# Patient Record
Sex: MALE | Race: BLACK OR AFRICAN AMERICAN | NOT HISPANIC OR LATINO | Employment: FULL TIME | URBAN - METROPOLITAN AREA
[De-identification: names, ages, dates, MRNs, and addresses within clinical notes are randomized per-mention and may not be internally consistent; named-entity substitution may affect disease eponyms.]

---

## 2017-04-26 ENCOUNTER — TRANSFERRED RECORDS (OUTPATIENT)
Dept: HEALTH INFORMATION MANAGEMENT | Facility: CLINIC | Age: 22
End: 2017-04-26

## 2017-05-05 ENCOUNTER — OFFICE VISIT (OUTPATIENT)
Dept: FAMILY MEDICINE | Facility: CLINIC | Age: 22
End: 2017-05-05
Payer: COMMERCIAL

## 2017-05-05 VITALS
DIASTOLIC BLOOD PRESSURE: 88 MMHG | OXYGEN SATURATION: 99 % | HEART RATE: 67 BPM | TEMPERATURE: 98 F | BODY MASS INDEX: 27.21 KG/M2 | WEIGHT: 223.5 LBS | SYSTOLIC BLOOD PRESSURE: 130 MMHG

## 2017-05-05 DIAGNOSIS — R51.9 HEADACHE DISORDER: ICD-10-CM

## 2017-05-05 DIAGNOSIS — M25.519 SHOULDER PAIN, UNSPECIFIED CHRONICITY, UNSPECIFIED LATERALITY: ICD-10-CM

## 2017-05-05 DIAGNOSIS — M54.6 ACUTE RIGHT-SIDED THORACIC BACK PAIN: Primary | ICD-10-CM

## 2017-05-05 PROCEDURE — 99213 OFFICE O/P EST LOW 20 MIN: CPT | Performed by: FAMILY MEDICINE

## 2017-05-05 RX ORDER — CYCLOBENZAPRINE HCL 10 MG
10 TABLET ORAL 3 TIMES DAILY PRN
Qty: 45 TABLET | Refills: 0 | Status: SHIPPED | OUTPATIENT
Start: 2017-05-05

## 2017-05-05 RX ORDER — DICLOFENAC SODIUM 75 MG/1
75 TABLET, DELAYED RELEASE ORAL 2 TIMES DAILY PRN
Qty: 60 TABLET | Refills: 1 | Status: SHIPPED | OUTPATIENT
Start: 2017-05-05

## 2017-05-05 ASSESSMENT — PAIN SCALES - GENERAL: PAINLEVEL: SEVERE PAIN (7)

## 2017-05-05 NOTE — NURSING NOTE
"Chief Complaint   Patient presents with     ER F/U       Initial /88  Pulse 67  Temp 98  F (36.7  C) (Oral)  Wt 223 lb 8 oz (101.4 kg)  SpO2 99%  BMI 27.21 kg/m2 Estimated body mass index is 27.21 kg/(m^2) as calculated from the following:    Height as of 5/12/16: 6' 4\" (1.93 m).    Weight as of this encounter: 223 lb 8 oz (101.4 kg).  Medication Reconciliation: complete    "
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
## 2017-05-05 NOTE — PATIENT INSTRUCTIONS
[facility]    If you have any questions regarding to your visit please contact your care team:       Team Purple:   Clinic Hours Telephone Number   [address]ATH[address]., Dr.   7am-7pm  Monday - Thursday   7am-5pm  Fridays  [phone]  (Appointment scheduling available 24/7)    Questions about your Visit?   Team Line:  [phone]   Urgent Care - [place] and [facility] - 11am-9pm Monday-Friday Saturday-Sunday- 9am-5pm   [place] - 5pm-9pm Monday-Friday Saturday-Sunday- 9am-5pm  [phone] - [facility]  [phone] - [place]       What options do I have for visits at the clinic other than the traditional office visit?  To expand how we care for you, many of our providers are utilizing electronic visits (e-visits) and telephone visits, when medically appropriate, for interactions with their patients rather than a visit in the clinic.   We also offer nurse visits for many medical concerns. Just like any other service, we will bill your insurance company for this type of visit based on time spent on the phone with your provider. Not all insurance companies cover these visits. Please check with your medical insurance if this type of visit is covered. You will be responsible for any charges that are not paid by your insurance.      E-visits via [employer]:  generally incur a $35.00 fee.  Telephone visits:  Time spent on the phone: *charged based on time that is spent on the phone in increments of 10 minutes. Estimated cost:   5-10 mins $30.00   11-20 mins. $59.00   21-30 mins. $85.00     Use [employer] (secure email communication and access to your chart) to send your primary care provider a message or make an appointment. Ask someone on your Team how to sign up for [employer].  For a Price Quote for your services, please call our Consumer Price Line at [phone].  As always, Thank you for trusting us with your health care  needs!    Discharged by JOB Smith

## 2017-05-05 NOTE — MR AVS SNAPSHOT
After Visit Summary   5/5/2017    Rossy Dodson    MRN: [id number]           Patient Information     Date Of Birth          1995        Visit Information        Provider Department      5/5/2017 11:40 AM Stone Sanderson MD [facility]        Today's Diagnoses     Acute right-sided thoracic back pain    -  1    Headache disorder        Shoulder pain, unspecified chronicity, unspecified laterality: Rt > Lt          Care Instructions    [facility]    If you have any questions regarding to your visit please contact your care team:       Team Purple:   Clinic Hours Telephone Number   KATHARINA Smith Dr., Dr.   7am-7pm  Monday - Thursday   7am-5pm  Fridays  [phone]  (Appointment scheduling available 24/7)    Questions about your Visit?   Team Line:  [phone]   [facility] [place] [facility] - 11am-9pm Monday-Friday Saturday-Sunday- 9am-5pm   [place] - 5pm-9pm Monday-Friday Saturday-Sunday- 9am-5pm  [phone] - [place]   [phone] - [place]       What options do I have for visits at the clinic other than the traditional office visit?  To expand how we care for you, many of our providers are utilizing electronic visits (e-visits) and telephone visits, when medically appropriate, for interactions with their patients rather than a visit in the clinic.   We also offer nurse visits for many medical concerns. Just like any other service, we will bill your insurance company for this type of visit based on time spent on the phone with your provider. Not all insurance companies cover these visits. Please check with your medical insurance if this type of visit is covered. You will be responsible for any charges that are not paid by your insurance.      E-visits via [employer]:  generally incur a $35.00 fee.  Telephone visits:  Time spent on the phone: *charged based on time that is spent on  "the phone in increments of 10 minutes. Estimated cost:   5-10 mins $30.00   11-20 mins. $59.00   21-30 mins. $85.00     Use CarePoint [employer]hart (secure email communication and access to your chart) to send your primary care provider a message or make an appointment. Ask someone on your Team how to sign up for [employer].  For a Price Quote for your services, please call our H-care Line at [phone].  As always, Thank you for trusting us with your health care needs!    Discharged by JOB Smith          Follow-ups after your visit        Follow-up notes from your care team     Return in about 2 weeks (around 5/19/2017).      Who to contact     If you have questions or need follow up information about today's clinic visit or your schedule please contact [facility] directly at [phone].  Normal or non-critical lab and imaging results will be communicated to you by MyChart, letter or phone within 4 business days after the clinic has received the results. If you do not hear from us within 7 days, please contact the clinic through [employer] or phone. If you have a critical or abnormal lab result, we will notify you by phone as soon as possible.  Submit refill requests through LeadCloud or call your pharmacy and they will forward the refill request to us. Please allow 3 business days for your refill to be completed.          Additional Information About Your Visit        [employer] Information     Volext lets you send messages to your doctor, view your test results, renew your prescriptions, schedule appointments and more. To sign up, go to [email or web address] . Click on \"Log in\" on the left side of the screen, which will take you to the Welcome page. Then click on \"Sign up Now\" on the right side of the page.     You will be asked to enter the access code listed below, as well as some personal information. Please follow the directions to create your username and password.     Your access code is: " [email or web address]  Expires: 2017  7:03 PM     Your access code will  in 90 days. If you need help or a new code, please call your [place] clinic or [phone].        Care EveryWhere ID     This is your Care EveryWhere ID. This could be used by other organizations to access your [place] medical records  [id number]        Your Vitals Were     Pulse Temperature Pulse Oximetry BMI (Body Mass Index)          67 98  F (36.7  C) (Oral) 99% 27.21 kg/m2         Blood Pressure from Last 3 Encounters:   17 130/88   16 149/70   16 (!) 138/114    Weight from Last 3 Encounters:   17 223 lb 8 oz (101.4 kg)   16 178 lb (80.7 kg)   08/18/15 187 lb 8 oz (85 kg)              Today, you had the following     No orders found for display         Today's Medication Changes          These changes are accurate as of: 17 12:16 PM.  If you have any questions, ask your nurse or doctor.               Start taking these medicines.        Dose/Directions    cyclobenzaprine 10 MG tablet   Commonly known as:  FLEXERIL   Used for:  Acute right-sided thoracic back pain, Headache disorder   Started by:  Stone Sanderson MD        Dose:  10 mg   Take 1 tablet (10 mg) by mouth 3 times daily as needed for muscle spasms   Quantity:  45 tablet   Refills:  0       diclofenac 75 MG EC tablet   Commonly known as:  VOLTAREN   Used for:  Acute right-sided thoracic back pain, Headache disorder, Shoulder pain, unspecified chronicity, unspecified laterality   Started by:  Stone Sanderson MD        Dose:  75 mg   Take 1 tablet (75 mg) by mouth 2 times daily as needed for moderate pain   Quantity:  60 tablet   Refills:  1            Where to get your medicines      These medications [facility] to [facility] [address]  [address] AT [facility] & [place]  [address] [place] [address]     Phone:  [phone]     cyclobenzaprine 10 MG tablet    diclofenac  75 MG EC tablet                Primary Care Provider Office Phone # Fax #    Stone Sanderson -[phone]       [address] [address]        Thank you!     Thank you for choosing [facility]  for your care. Our goal is always to provide you with excellent care. Hearing back from our patients is one way we can continue to improve our services. Please take a few minutes to complete the written survey that you may receive in the mail after your visit with us. Thank you!             Your Updated Medication List - Protect others around you: Learn how to safely use, store and throw away your medicines at www.disposemymeds.org.          This list is accurate as of: 5/5/17 12:16 PM.  Always use your most recent med list.                   Brand Name Dispense Instructions for use    cyclobenzaprine 10 MG tablet    FLEXERIL    45 tablet    Take 1 tablet (10 mg) by mouth 3 times daily as needed for muscle spasms       diclofenac 75 MG EC tablet    VOLTAREN    60 tablet    Take 1 tablet (75 mg) by mouth 2 times daily as needed for moderate pain       famotidine 40 MG tablet    PEPCID    30 tablet    Take 1 tablet (40 mg) by mouth daily

## 2017-11-21 ENCOUNTER — TRANSFERRED RECORDS (OUTPATIENT)
Dept: HEALTH INFORMATION MANAGEMENT | Facility: CLINIC | Age: 22
End: 2017-11-21

## 2017-11-24 ENCOUNTER — TRANSFERRED RECORDS (OUTPATIENT)
Dept: HEALTH INFORMATION MANAGEMENT | Facility: CLINIC | Age: 22
End: 2017-11-24

## 2017-12-10 ENCOUNTER — TRANSFERRED RECORDS (OUTPATIENT)
Dept: HEALTH INFORMATION MANAGEMENT | Facility: CLINIC | Age: 22
End: 2017-12-10

## 2018-01-24 ENCOUNTER — TELEPHONE (OUTPATIENT)
Dept: FAMILY MEDICINE | Facility: CLINIC | Age: 23
End: 2018-01-24

## 2018-01-24 NOTE — TELEPHONE ENCOUNTER
"Reason for Call:  Other call back    Detailed comments:  Patient calling. He has a fever, chest pain, for 2 days.     Phone Number Patient can be reached at: Other phone number:   [phone]    Best Time:      Can we leave a detailed message on this number? { :[phone]::\"YES\"}    Call taken on 1/24/2018 at 8:35 AM by Izzy Felipe    "

## 2018-02-21 ENCOUNTER — OFFICE VISIT (OUTPATIENT)
Dept: FAMILY MEDICINE | Facility: CLINIC | Age: 23
End: 2018-02-21
Payer: COMMERCIAL

## 2018-02-21 VITALS
OXYGEN SATURATION: 98 % | WEIGHT: 224.6 LBS | HEART RATE: 72 BPM | DIASTOLIC BLOOD PRESSURE: 78 MMHG | TEMPERATURE: 96.9 F | SYSTOLIC BLOOD PRESSURE: 118 MMHG | BODY MASS INDEX: 27.34 KG/M2

## 2018-02-21 DIAGNOSIS — N50.82 SCROTAL PAIN: ICD-10-CM

## 2018-02-21 DIAGNOSIS — R10.30 ABDOMINAL PAIN, LOWER: Primary | ICD-10-CM

## 2018-02-21 PROCEDURE — 87591 N.GONORRHOEAE DNA AMP PROB: CPT | Performed by: FAMILY MEDICINE

## 2018-02-21 PROCEDURE — 99213 OFFICE O/P EST LOW 20 MIN: CPT | Performed by: FAMILY MEDICINE

## 2018-02-21 PROCEDURE — 87491 CHLMYD TRACH DNA AMP PROBE: CPT | Performed by: FAMILY MEDICINE

## 2018-02-21 NOTE — PROGRESS NOTES
"  SUBJECTIVE:   Rossy Dodson is a 22 year old male who presents to clinic today for the following health issues:    ABDOMINAL PAIN     Onset: 2 weeks    Description:   Character: Dull ache  Location: pelvic region  Radiation: None    Intensity: currently 4/10 at its worst 8/10    Progression of Symptoms:  worsening and intermittent    Accompanying Signs & Symptoms:  Fever/Chills?: no   Gas/Bloating: no   Nausea: no   Vomitting: no   Diarrhea?: YES  Constipation:YES  Dysuria or Hematuria: no    History:   Trauma: no   Previous similar pain: YES   Previous tests done: none    Precipitating factors:   Does the pain change with:     Food: no      BM: no     Urination: no     Alleviating factors:      Therapies Tried and outcome: Drinking milk ; Helped    LMP:  not applicable     Pain is in the lower abdomen, radiates to the right testicular area.  No dysuria or discharge. He says partner was diagnosed with \"chlamydia pneumonia\" and he was also given treatment  Bowel movement normal  No swelling in the upper abdomen.    Problem list and histories reviewed & adjusted, as indicated.  Additional history: as documented    Patient Active Problem List   Diagnosis     Learning difficulty, other     Hyperlipidemia LDL goal <160     No past surgical history on file.    Social History   Substance Use Topics     Smoking status: Never Smoker     Smokeless tobacco: Never Used     Alcohol use No     No family history on file.      Reviewed and updated as needed this visit by clinical staff    ROS:  Constitutional, HEENT, cardiovascular, pulmonary, gi and gu systems are negative, except as otherwise noted.    OBJECTIVE:     /78  Pulse 72  Temp 96.9  F (36.1  C) (Oral)  Wt 224 lb 9.6 oz (101.9 kg)  SpO2 98%  BMI 27.34 kg/m2  Body mass index is 27.34 kg/(m^2).  GENERAL: healthy, alert and no distress  NECK: no adenopathy and thyroid normal to palpation  RESP: lungs clear to auscultation - no rales, rhonchi or wheezes  CV: " regular rate and rhythm, normal S1 S2, no S3 or S4, no murmur, click or rub, no peripheral edema.  ABDOMEN: soft, nontender, no masses and bowel sounds normal   (male): normal male genitalia without lesions or urethral discharge, no hernia, no scrotal swelling slight tenderness with prominence in the left epididymis  MS: no gross musculoskeletal defects noted, no edema    Diagnostic Test Results:  none     ASSESSMENT/PLAN:     (R10.30) Abdominal pain, lower  (primary encounter diagnosis)  Comment: Etiology not clear but cold be radiation from scrotum  Plan: Chlamydia check    (N50.82) Scrotal pain  Comment: Differentials: Epididymitis, varicocele, hydrocele. Discussed evaluation with ultrasound and check for chlamydia.  Plan: NEISSERIA GONORRHOEA PCR, CHLAMYDIA TRACHOMATIS        PCR, US Testicular and Scrotum      Stone Sanderson MD  [facility]

## 2018-02-21 NOTE — MR AVS SNAPSHOT
After Visit Summary   2/21/2018    Rossy Dodson    MRN: [id number]           Patient Information     Date Of Birth          1995        Visit Information        Provider Department      2/21/2018 12:40 PM Stone Sanderson MD [facility]        Today's Diagnoses     Abdominal pain, lower    -  1    Scrotal pain          Care Instructions    [facility]    If you have any questions regarding to your visit please contact your care team:       Team Purple:   Clinic Hours Telephone Number   Dr. Elsie Lipscomb   7am-7pm  Monday - Thursday   7am-5pm  Fridays  [phone]  (Appointment scheduling available 24/7)    Questions about your Visit?   Team Line:  [phone]   Urgent Care - [place] and [place] - 11am-9pm Monday-Friday Saturday-Sunday- 9am-5pm   [place] - 5pm-9pm Monday-Friday Saturday-Sunday- 9am-5pm  [phone] - [facility]  [phone] - [place]       What options do I have for visits at the clinic other than the traditional office visit?  To expand how we care for you, many of our providers are utilizing electronic visits (e-visits) and telephone visits, when medically appropriate, for interactions with their patients rather than a visit in the clinic.   We also offer nurse visits for many medical concerns. Just like any other service, we will bill your insurance company for this type of visit based on time spent on the phone with your provider. Not all insurance companies cover these visits. Please check with your medical insurance if this type of visit is covered. You will be responsible for any charges that are not paid by your insurance.      E-visits via [employer]:  generally incur a $35.00 fee.  Telephone visits:  Time spent on the phone: *charged based on time that is spent on the phone in increments of 10 minutes. Estimated cost:   5-10 mins $30.00   11-20 mins.  "$59.00   21-30 mins. $85.00     Use [employer]UBI[employer]art (secure email communication and access to your chart) to send your primary care provider a message or make an appointment. Ask someone on your Team how to sign up for [employer].  For a Price Quote for your services, please call our Consumer Price Line at [phone].  As always, Thank you for trusting us with your health care needs!    Discharged By: An            Follow-ups after your visit        Future tests that were ordered for you today     Open Future Orders        Priority Expected Expires Ordered    US Testicular and Scrotum Routine  2/21/2019 2/21/2018            Who to contact     If you have questions or need follow up information about today's clinic visit or your schedule please contact [facility] directly at [phone].  Normal or non-critical lab and imaging results will be communicated to you by THUBIThart, letter or phone within 4 business days after the clinic has received the results. If you do not hear from us within 7 days, please contact the clinic through THUBI[employer]art or phone. If you have a critical or abnormal lab result, we will notify you by phone as soon as possible.  Submit refill requests through [employer] or call your pharmacy and they will forward the refill request to us. Please allow 3 business days for your refill to be completed.          Additional Information About Your Visit        [employer]art Information     [employer] lets you send messages to your doctor, view your test results, renew your prescriptions, schedule appointments and more. To sign up, go to [email or web address] . Click on \"Log in\" on the left side of the screen, which will take you to the Welcome page. Then click on \"Sign up Now\" on the right side of the page.     You will be asked to enter the access code listed below, as well as some personal information. Please follow the directions to create your username and password.     Your access code is: " [email or web address]  Expires: 2018  9:44 AM     Your access code will  in 90 days. If you need help or a new code, please call your [place] clinic or [phone].        Care EveryWhere ID     This is your Care EveryWhere ID. This could be used by other organizations to access your [place] medical records  [id number]        Your Vitals Were     Pulse Temperature Pulse Oximetry BMI (Body Mass Index)          72 96.9  F (36.1  C) (Oral) 98% 27.34 kg/m2         Blood Pressure from Last 3 Encounters:   18 118/78   17 130/88   16 149/70    Weight from Last 3 Encounters:   18 224 lb 9.6 oz (101.9 kg)   17 223 lb 8 oz (101.4 kg)   16 178 lb (80.7 kg)              We Performed the Following     CHLAMYDIA TRACHOMATIS PCR     NEISSERIA GONORRHOEA PCR        Primary Care Provider Office Phone # Fax #    Stone Sanderson -[phone]       [address] [facility] [address]        Equal Access to Services     [facility] AH: Hadii aad ku hadasho Soomaali, waaxda luqadaha, qaybta kaalmada adeegyada, mari downs . So [facility] [phone].    ATENCIÓN: Si habla español, tiene a saba disposición servicios gratuitos de asistencia lingüística. Llame al [phone].    We comply with applicable federal civil rights laws and [place] laws. We do not discriminate on the basis of race, color, national origin, age, disability, sex, sexual orientation, or gender identity.            Thank you!     Thank you for choosing [facility]  for your care. Our goal is always to provide you with excellent care. Hearing back from our patients is one way we can continue to improve our services. Please take a few minutes to complete the written survey that you may receive in the mail after your visit with us. Thank you!             Your Updated Medication List - Protect others around you: Learn how to safely use, store and throw away your  medicines at www.disposemymeds.org.          This list is accurate as of 2/21/18  1:20 PM.  Always use your most recent med list.                   Brand Name Dispense Instructions for use Diagnosis    cyclobenzaprine 10 MG tablet    FLEXERIL    45 tablet    Take 1 tablet (10 mg) by mouth 3 times daily as needed for muscle spasms    Acute right-sided thoracic back pain, Headache disorder       diclofenac 75 MG EC tablet    VOLTAREN    60 tablet    Take 1 tablet (75 mg) by mouth 2 times daily as needed for moderate pain    Acute right-sided thoracic back pain, Headache disorder, Shoulder pain, unspecified chronicity, unspecified laterality       famotidine 40 MG tablet    PEPCID    30 tablet    Take 1 tablet (40 mg) by mouth daily

## 2018-02-21 NOTE — PATIENT INSTRUCTIONS
[facility]    If you have any questions regarding to your visit please contact your care team:       Team Purple:   Clinic Hours Telephone Number   Dr. Elsie Lipscomb   7am-7pm  Monday - Thursday   7am-5pm  Fridays  [phone]  (Appointment scheduling available 24/7)    Questions about your Visit?   Team Line:  [phone]   Urgent Care - [place] and [facility] - 11am-9pm Monday-Friday Saturday-Sunday- 9am-5pm   [place] - 5pm-9pm Monday-Friday Saturday-Sunday- 9am-5pm  [phone] - [facility]  [phone] - [place]       What options do I have for visits at the clinic other than the traditional office visit?  To expand how we care for you, many of our providers are utilizing electronic visits (e-visits) and telephone visits, when medically appropriate, for interactions with their patients rather than a visit in the clinic.   We also offer nurse visits for many medical concerns. Just like any other service, we will bill your insurance company for this type of visit based on time spent on the phone with your provider. Not all insurance companies cover these visits. Please check with your medical insurance if this type of visit is covered. You will be responsible for any charges that are not paid by your insurance.      E-visits via [employer]in:  generally incur a $35.00 fee.  Telephone visits:  Time spent on the phone: *charged based on time that is spent on the phone in increments of 10 minutes. Estimated cost:   5-10 mins $30.00   11-20 mins. $59.00   21-30 mins. $85.00     Use [employer] (secure email communication and access to your chart) to send your primary care provider a message or make an appointment. Ask someone on your Team how to sign up for [employer].  For a Price Quote for your services, please call our Consumer Price Line at [phone].  As always, Thank you for trusting us with your health care  needs!    Discharged By: An

## 2018-02-22 LAB
C TRACH DNA SPEC QL NAA+PROBE: NEGATIVE
N GONORRHOEA DNA SPEC QL NAA+PROBE: NEGATIVE
SPECIMEN SOURCE: NORMAL
SPECIMEN SOURCE: NORMAL

## 2018-05-24 ENCOUNTER — TRANSFERRED RECORDS (OUTPATIENT)
Dept: HEALTH INFORMATION MANAGEMENT | Facility: CLINIC | Age: 23
End: 2018-05-24

## 2021-11-16 ENCOUNTER — OFFICE VISIT (OUTPATIENT)
Dept: FAMILY MEDICINE | Facility: CLINIC | Age: 26
End: 2021-11-16
Payer: COMMERCIAL

## 2021-11-16 VITALS
WEIGHT: 207 LBS | SYSTOLIC BLOOD PRESSURE: 103 MMHG | HEIGHT: 70 IN | OXYGEN SATURATION: 99 % | HEART RATE: 74 BPM | BODY MASS INDEX: 29.63 KG/M2 | TEMPERATURE: 98 F | DIASTOLIC BLOOD PRESSURE: 67 MMHG

## 2021-11-16 DIAGNOSIS — R19.7 DIARRHEA, UNSPECIFIED TYPE: Primary | ICD-10-CM

## 2021-11-16 PROCEDURE — 99203 OFFICE O/P NEW LOW 30 MIN: CPT | Performed by: FAMILY MEDICINE

## 2021-11-16 ASSESSMENT — MIFFLIN-ST. JEOR: SCORE: 1919.58

## 2021-11-16 NOTE — PROGRESS NOTES
"  Assessment & Plan       ICD-10-CM    1. Diarrhea, unspecified type  R19.7 Adult Gastro Ref - Consult Only     Enteric Bacteria and Virus Panel by LD Stool     Fecal Lactoferrin     Occult blood fecal HGB immuno     Cryptosporidium Stain     Clostridium difficile toxin B PCR     Clostridium difficile toxin B PCR     Cryptosporidium Stain     Fecal Lactoferrin     Enteric Bacteria and Virus Panel by LD Stool     Occult blood fecal HGB immuno         Review of external notes as documented elsewhere in note           Return in about 2 weeks (around 11/30/2021) for With Specialist.    Burt Lipscomb MD  [facility] SAPNA Blair is a 26 year old who presents for the following health issues     HPI     Diarrhea  Onset/Duration: 1 week  Description:       Consistency of stool: watery, yellow and green       Blood in stool: no       Number of loose stools past 24 hours: 15-20  Progression of Symptoms: worsening  Accompanying signs and symptoms:       Fever: no       Nausea/Vomiting: no       Abdominal pain: no       Weight loss: no       Episodes of constipation: no  History   Ill contacts: no  Recent use of antibiotics: no  Recent travels: no  Recent medication-new or changes(Rx or OTC): no  Precipitating or alleviating factors: None  Therapies tried and outcome: PeptoBismol      Abdominal pain from 2018 has resolved  Saw [place] gastro  Wanted to do upper endoscopy however had to leave the country  Bloody stool has resolved    Currently has diarrhea  Every 10 mins  watery  X 3-4 days  pepto helped, less frequent stool          Review of Systems   Constitutional, HEENT, cardiovascular, pulmonary, gi and gu systems are negative, except as otherwise noted.      Objective    /67   Pulse 74   Temp 98  F (36.7  C) (Oral)   Ht 1.777 m (5' 9.96\")   Wt 93.9 kg (207 lb)   SpO2 99%   BMI 29.73 kg/m    Body mass index is 29.73 kg/m .  Physical Exam   GENERAL: healthy, alert and no " distress  EYES: Eyes grossly normal to inspection, PERRL and conjunctivae and sclerae normal  NECK: no adenopathy, no asymmetry, masses, or scars and thyroid normal to palpation  ABDOMEN: soft, nontender, no hepatosplenomegaly, no masses and bowel sounds normal  SKIN: no suspicious lesions or rashes  PSYCH: mentation appears normal, affect normal/bright

## 2023-06-20 ENCOUNTER — HOSPITAL ENCOUNTER (EMERGENCY)
Facility: CLINIC | Age: 28
Discharge: HOME | End: 2023-06-20
Attending: EMERGENCY MEDICINE | Admitting: EMERGENCY MEDICINE
Payer: COMMERCIAL

## 2023-06-20 ENCOUNTER — HOSPITAL ENCOUNTER (EMERGENCY)
Facility: CLINIC | Age: 28
End: 2023-06-20

## 2023-06-20 VITALS
HEART RATE: 47 BPM | SYSTOLIC BLOOD PRESSURE: 95 MMHG | OXYGEN SATURATION: 99 % | BODY MASS INDEX: 22.4 KG/M2 | HEIGHT: 71 IN | RESPIRATION RATE: 18 BRPM | TEMPERATURE: 98 F | DIASTOLIC BLOOD PRESSURE: 56 MMHG | WEIGHT: 160 LBS

## 2023-06-20 DIAGNOSIS — S01.81XA CHIN LACERATION, INITIAL ENCOUNTER: ICD-10-CM

## 2023-06-20 DIAGNOSIS — S02.5XXA CLOSED FRACTURE OF TOOTH, INITIAL ENCOUNTER: ICD-10-CM
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
## 2023-06-20 PROCEDURE — 99283 EMERGENCY DEPT VISIT LOW MDM: CPT | Performed by: EMERGENCY MEDICINE

## 2023-06-20 PROCEDURE — 12011 RPR F/E/E/N/L/M 2.5 CM/<: CPT | Performed by: EMERGENCY MEDICINE

## 2023-06-20 PROCEDURE — 250N000009 HC RX 250: Performed by: EMERGENCY MEDICINE

## 2023-06-20 PROCEDURE — 99283 EMERGENCY DEPT VISIT LOW MDM: CPT | Mod: 25 | Performed by: EMERGENCY MEDICINE

## 2023-06-20 RX ORDER — IBUPROFEN 600 MG/1
600 TABLET, FILM COATED ORAL EVERY 6 HOURS PRN
Qty: 20 TABLET | Refills: 0 | Status: SHIPPED | OUTPATIENT
Start: 2023-06-20

## 2023-06-20 RX ORDER — LIDOCAINE HYDROCHLORIDE 10 MG/ML
10 INJECTION, SOLUTION INFILTRATION; PERINEURAL ONCE
Status: COMPLETED | OUTPATIENT
Start: 2023-06-20 | End: 2023-06-20

## 2023-06-20 RX ADMIN — LIDOCAINE HYDROCHLORIDE 10 ML: 10 INJECTION, SOLUTION INFILTRATION; PERINEURAL at 03:24

## 2023-06-20 ASSESSMENT — ACTIVITIES OF DAILY LIVING (ADL): ADLS_ACUITY_SCORE: 33
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
## 2023-06-20 NOTE — DISCHARGE INSTRUCTIONS
Keep your wound clean and relatively dry - it's ok to wash your face but no swimming or soaking. Follow up with your doctor in 7 days for suture removal. Return if you develop redness, milky drainage or any other concerns. Follow up with your dentist as soon as possible.

## 2023-08-28 ENCOUNTER — HOSPITAL ENCOUNTER (INPATIENT)
Facility: CLINIC | Age: 28
Setting detail: SURGERY ADMIT
End: 2023-08-28
Attending: SURGERY | Admitting: SURGERY

## 2023-08-28 ENCOUNTER — HOSPITAL ENCOUNTER (OUTPATIENT)
Facility: CLINIC | Age: 28
Discharge: HOME | End: 2023-08-29
Attending: EMERGENCY MEDICINE | Admitting: SURGERY
Payer: COMMERCIAL

## 2023-08-28 ENCOUNTER — APPOINTMENT (OUTPATIENT)
Dept: GENERAL RADIOLOGY | Facility: CLINIC | Age: 28
End: 2023-08-28
Attending: EMERGENCY MEDICINE
Payer: COMMERCIAL

## 2023-08-28 ENCOUNTER — APPOINTMENT (OUTPATIENT)
Dept: CT IMAGING | Facility: CLINIC | Age: 28
End: 2023-08-28
Attending: EMERGENCY MEDICINE
Payer: COMMERCIAL

## 2023-08-28 ENCOUNTER — ANESTHESIA EVENT (OUTPATIENT)
Dept: SURGERY | Facility: CLINIC | Age: 28
End: 2023-08-28
Payer: COMMERCIAL

## 2023-08-28 ENCOUNTER — ANESTHESIA (OUTPATIENT)
Dept: SURGERY | Facility: CLINIC | Age: 28
End: 2023-08-28
Payer: COMMERCIAL

## 2023-08-28 DIAGNOSIS — S21.112S: ICD-10-CM

## 2023-08-28 DIAGNOSIS — T79.7XXS: ICD-10-CM

## 2023-08-28 DIAGNOSIS — G89.18 ACUTE POST-OPERATIVE PAIN: Primary | ICD-10-CM

## 2023-08-28 DIAGNOSIS — L08.9 WOUND INFECTION: ICD-10-CM

## 2023-08-28 DIAGNOSIS — J98.2 PNEUMOMEDIASTINUM (H): ICD-10-CM

## 2023-08-28 DIAGNOSIS — T14.8XXA WOUND INFECTION: ICD-10-CM

## 2023-08-28 LAB
ANION GAP SERPL CALCULATED.3IONS-SCNC: 10 MMOL/L (ref 7–15)
BUN SERPL-MCNC: 12.1 MG/DL (ref 6–20)
CA-I BLD-MCNC: 4.7 MG/DL (ref 4.4–5.2)
CALCIUM SERPL-MCNC: 8.5 MG/DL (ref 8.6–10)
CHLORIDE SERPL-SCNC: 101 MMOL/L (ref 98–107)
CPB POCT: NO
CREAT BLD-MCNC: 0.9 MG/DL (ref 0.7–1.3)
CREAT SERPL-MCNC: 0.8 MG/DL (ref 0.67–1.17)
DEPRECATED HCO3 PLAS-SCNC: 25 MMOL/L (ref 22–29)
ERYTHROCYTE [DISTWIDTH] IN BLOOD BY AUTOMATED COUNT: 13.2 % (ref 10–15)
ERYTHROCYTE [DISTWIDTH] IN BLOOD BY AUTOMATED COUNT: 13.2 % (ref 10–15)
GFR SERPL CREATININE-BSD FRML MDRD: >60 ML/MIN/1.73M2
GFR SERPL CREATININE-BSD FRML MDRD: >90 ML/MIN/1.73M2
GLUCOSE BLD-MCNC: 109 MG/DL (ref 70–99)
GLUCOSE BLDC GLUCOMTR-MCNC: 75 MG/DL (ref 70–99)
GLUCOSE SERPL-MCNC: 93 MG/DL (ref 70–99)
HCO3 BLDV-SCNC: 27 MMOL/L (ref 21–28)
HCT VFR BLD AUTO: 33.9 % (ref 40–53)
HCT VFR BLD AUTO: 39.6 % (ref 40–53)
HCT VFR BLD CALC: 33 % (ref 40–53)
HGB BLD-MCNC: 11.2 G/DL (ref 13.3–17.7)
HGB BLD-MCNC: 11.4 G/DL (ref 13.3–17.7)
HGB BLD-MCNC: 12.6 G/DL (ref 13.3–17.7)
INR PPP: 1.19 (ref 0.85–1.15)
LACTATE SERPL-SCNC: 0.6 MMOL/L (ref 0.7–2)
MCH RBC QN AUTO: 29.4 PG (ref 26.5–33)
MCH RBC QN AUTO: 30.6 PG (ref 26.5–33)
MCHC RBC AUTO-ENTMCNC: 31.8 G/DL (ref 31.5–36.5)
MCHC RBC AUTO-ENTMCNC: 33.6 G/DL (ref 31.5–36.5)
MCV RBC AUTO: 91 FL (ref 78–100)
MCV RBC AUTO: 93 FL (ref 78–100)
PCO2 BLDV: 46 MM HG (ref 40–50)
PH BLDV: 7.37 [PH] (ref 7.32–7.43)
PLATELET # BLD AUTO: 282 10E3/UL (ref 150–450)
PLATELET # BLD AUTO: 336 10E3/UL (ref 150–450)
PO2 BLDV: 63 MM HG (ref 25–47)
POTASSIUM BLD-SCNC: 3.5 MMOL/L (ref 3.4–5.3)
POTASSIUM SERPL-SCNC: 4.1 MMOL/L (ref 3.4–5.3)
PROCALCITONIN SERPL IA-MCNC: 0.09 NG/ML
RBC # BLD AUTO: 3.72 10E6/UL (ref 4.4–5.9)
RBC # BLD AUTO: 4.28 10E6/UL (ref 4.4–5.9)
SAO2 % BLDV: 91 % (ref 94–100)
SODIUM BLD-SCNC: 135 MMOL/L (ref 133–144)
SODIUM SERPL-SCNC: 136 MMOL/L (ref 136–145)
WBC # BLD AUTO: 10.5 10E3/UL (ref 4–11)
WBC # BLD AUTO: 9.5 10E3/UL (ref 4–11)

## 2023-08-28 PROCEDURE — 82565 ASSAY OF CREATININE: CPT | Mod: 91

## 2023-08-28 PROCEDURE — 96361 HYDRATE IV INFUSION ADD-ON: CPT | Mod: 59

## 2023-08-28 PROCEDURE — 370N000017 HC ANESTHESIA TECHNICAL FEE, PER MIN: Performed by: SURGERY

## 2023-08-28 PROCEDURE — 250N000009 HC RX 250: Performed by: SURGERY

## 2023-08-28 PROCEDURE — 87205 SMEAR GRAM STAIN: CPT | Performed by: SURGERY

## 2023-08-28 PROCEDURE — 85027 COMPLETE CBC AUTOMATED: CPT | Performed by: NURSE PRACTITIONER

## 2023-08-28 PROCEDURE — 250N000011 HC RX IP 250 OP 636

## 2023-08-28 PROCEDURE — 96375 TX/PRO/DX INJ NEW DRUG ADDON: CPT | Performed by: EMERGENCY MEDICINE

## 2023-08-28 PROCEDURE — 36415 COLL VENOUS BLD VENIPUNCTURE: CPT | Performed by: NURSE PRACTITIONER

## 2023-08-28 PROCEDURE — 96375 TX/PRO/DX INJ NEW DRUG ADDON: CPT | Mod: 59

## 2023-08-28 PROCEDURE — 272N000001 HC OR GENERAL SUPPLY STERILE: Performed by: SURGERY

## 2023-08-28 PROCEDURE — 99221 1ST HOSP IP/OBS SF/LOW 40: CPT | Mod: AI | Performed by: SURGERY

## 2023-08-28 PROCEDURE — 250N000009 HC RX 250

## 2023-08-28 PROCEDURE — 710N000010 HC RECOVERY PHASE 1, LEVEL 2, PER MIN: Performed by: SURGERY

## 2023-08-28 PROCEDURE — 258N000003 HC RX IP 258 OP 636

## 2023-08-28 PROCEDURE — 82947 ASSAY GLUCOSE BLOOD QUANT: CPT | Performed by: NURSE PRACTITIONER

## 2023-08-28 PROCEDURE — 71046 X-RAY EXAM CHEST 2 VIEWS: CPT

## 2023-08-28 PROCEDURE — 70450 CT HEAD/BRAIN W/O DYE: CPT

## 2023-08-28 PROCEDURE — 250N000013 HC RX MED GY IP 250 OP 250 PS 637: Performed by: NURSE PRACTITIONER

## 2023-08-28 PROCEDURE — 83605 ASSAY OF LACTIC ACID: CPT | Performed by: NURSE PRACTITIONER

## 2023-08-28 PROCEDURE — 87075 CULTR BACTERIA EXCEPT BLOOD: CPT | Performed by: SURGERY

## 2023-08-28 PROCEDURE — 88304 TISSUE EXAM BY PATHOLOGIST: CPT | Mod: TC | Performed by: SURGERY

## 2023-08-28 PROCEDURE — 96365 THER/PROPH/DIAG IV INF INIT: CPT | Performed by: EMERGENCY MEDICINE

## 2023-08-28 PROCEDURE — 258N000003 HC RX IP 258 OP 636: Performed by: NURSE PRACTITIONER

## 2023-08-28 PROCEDURE — 250N000025 HC SEVOFLURANE, PER MIN: Performed by: SURGERY

## 2023-08-28 PROCEDURE — 250N000009 HC RX 250: Performed by: EMERGENCY MEDICINE

## 2023-08-28 PROCEDURE — 88304 TISSUE EXAM BY PATHOLOGIST: CPT | Mod: 26 | Performed by: PATHOLOGY

## 2023-08-28 PROCEDURE — 360N000076 HC SURGERY LEVEL 3, PER MIN: Performed by: SURGERY

## 2023-08-28 PROCEDURE — 999N000141 HC STATISTIC PRE-PROCEDURE NURSING ASSESSMENT: Performed by: SURGERY

## 2023-08-28 PROCEDURE — 250N000011 HC RX IP 250 OP 636: Mod: JZ | Performed by: EMERGENCY MEDICINE

## 2023-08-28 PROCEDURE — 87077 CULTURE AEROBIC IDENTIFY: CPT | Performed by: SURGERY

## 2023-08-28 PROCEDURE — 71260 CT THORAX DX C+: CPT

## 2023-08-28 PROCEDURE — 250N000013 HC RX MED GY IP 250 OP 250 PS 637

## 2023-08-28 PROCEDURE — 96368 THER/DIAG CONCURRENT INF: CPT | Performed by: EMERGENCY MEDICINE

## 2023-08-28 PROCEDURE — 99222 1ST HOSP IP/OBS MODERATE 55: CPT | Performed by: NURSE PRACTITIONER

## 2023-08-28 PROCEDURE — 20102 EXPL PENTRG WND ABD/FLNK/BK: CPT | Performed by: SURGERY

## 2023-08-28 PROCEDURE — 99284 EMERGENCY DEPT VISIT MOD MDM: CPT | Performed by: EMERGENCY MEDICINE

## 2023-08-28 PROCEDURE — 250N000011 HC RX IP 250 OP 636: Performed by: SURGERY

## 2023-08-28 PROCEDURE — 96374 THER/PROPH/DIAG INJ IV PUSH: CPT | Mod: 59

## 2023-08-28 PROCEDURE — 250N000011 HC RX IP 250 OP 636: Performed by: NURSE PRACTITIONER

## 2023-08-28 PROCEDURE — 84145 PROCALCITONIN (PCT): CPT | Performed by: NURSE PRACTITIONER

## 2023-08-28 PROCEDURE — 99285 EMERGENCY DEPT VISIT HI MDM: CPT | Mod: 25 | Performed by: EMERGENCY MEDICINE

## 2023-08-28 PROCEDURE — 82330 ASSAY OF CALCIUM: CPT

## 2023-08-28 PROCEDURE — 85610 PROTHROMBIN TIME: CPT | Performed by: NURSE PRACTITIONER

## 2023-08-28 PROCEDURE — 87176 TISSUE HOMOGENIZATION CULTR: CPT | Performed by: SURGERY

## 2023-08-28 RX ORDER — HYDROXYZINE HYDROCHLORIDE 25 MG/1
25-50 TABLET, FILM COATED ORAL 3 TIMES DAILY PRN
Status: DISCONTINUED | OUTPATIENT
Start: 2023-08-28 | End: 2023-08-29 | Stop reason: HOSPADM

## 2023-08-28 RX ORDER — ONDANSETRON 2 MG/ML
4 INJECTION INTRAMUSCULAR; INTRAVENOUS EVERY 6 HOURS PRN
Status: DISCONTINUED | OUTPATIENT
Start: 2023-08-28 | End: 2023-08-29 | Stop reason: HOSPADM

## 2023-08-28 RX ORDER — SODIUM CHLORIDE, SODIUM LACTATE, POTASSIUM CHLORIDE, CALCIUM CHLORIDE 600; 310; 30; 20 MG/100ML; MG/100ML; MG/100ML; MG/100ML
INJECTION, SOLUTION INTRAVENOUS CONTINUOUS PRN
Status: DISCONTINUED | OUTPATIENT
Start: 2023-08-28 | End: 2023-08-28

## 2023-08-28 RX ORDER — POLYETHYLENE GLYCOL 3350 17 G/17G
17 POWDER, FOR SOLUTION ORAL DAILY PRN
Status: DISCONTINUED | OUTPATIENT
Start: 2023-08-28 | End: 2023-08-29 | Stop reason: HOSPADM

## 2023-08-28 RX ORDER — NALOXONE HYDROCHLORIDE 0.4 MG/ML
0.4 INJECTION, SOLUTION INTRAMUSCULAR; INTRAVENOUS; SUBCUTANEOUS
Status: DISCONTINUED | OUTPATIENT
Start: 2023-08-28 | End: 2023-08-29 | Stop reason: HOSPADM

## 2023-08-28 RX ORDER — ONDANSETRON 2 MG/ML
INJECTION INTRAMUSCULAR; INTRAVENOUS PRN
Status: DISCONTINUED | OUTPATIENT
Start: 2023-08-28 | End: 2023-08-28

## 2023-08-28 RX ORDER — PROCHLORPERAZINE MALEATE 10 MG
10 TABLET ORAL EVERY 6 HOURS PRN
Status: DISCONTINUED | OUTPATIENT
Start: 2023-08-28 | End: 2023-08-29 | Stop reason: HOSPADM

## 2023-08-28 RX ORDER — SODIUM CHLORIDE, SODIUM LACTATE, POTASSIUM CHLORIDE, CALCIUM CHLORIDE 600; 310; 30; 20 MG/100ML; MG/100ML; MG/100ML; MG/100ML
INJECTION, SOLUTION INTRAVENOUS CONTINUOUS
Status: DISCONTINUED | OUTPATIENT
Start: 2023-08-28 | End: 2023-08-29

## 2023-08-28 RX ORDER — DIMENHYDRINATE 50 MG/ML
25 INJECTION, SOLUTION INTRAMUSCULAR; INTRAVENOUS
Status: DISCONTINUED | OUTPATIENT
Start: 2023-08-28 | End: 2023-08-28 | Stop reason: HOSPADM

## 2023-08-28 RX ORDER — HEPARIN SODIUM 5000 [USP'U]/.5ML
5000 INJECTION, SOLUTION INTRAVENOUS; SUBCUTANEOUS EVERY 8 HOURS
Status: DISCONTINUED | OUTPATIENT
Start: 2023-08-29 | End: 2023-08-29 | Stop reason: HOSPADM

## 2023-08-28 RX ORDER — OXYCODONE HYDROCHLORIDE 5 MG/1
5 TABLET ORAL EVERY 4 HOURS PRN
Status: DISCONTINUED | OUTPATIENT
Start: 2023-08-28 | End: 2023-08-28

## 2023-08-28 RX ORDER — CEFAZOLIN SODIUM 1 G/3ML
1 INJECTION, POWDER, FOR SOLUTION INTRAMUSCULAR; INTRAVENOUS EVERY 8 HOURS
Status: DISCONTINUED | OUTPATIENT
Start: 2023-08-28 | End: 2023-08-28

## 2023-08-28 RX ORDER — HYDROMORPHONE HCL IN WATER/PF 6 MG/30 ML
.2-.4 PATIENT CONTROLLED ANALGESIA SYRINGE INTRAVENOUS
Status: DISCONTINUED | OUTPATIENT
Start: 2023-08-28 | End: 2023-08-29

## 2023-08-28 RX ORDER — FENTANYL CITRATE 50 UG/ML
INJECTION, SOLUTION INTRAMUSCULAR; INTRAVENOUS PRN
Status: DISCONTINUED | OUTPATIENT
Start: 2023-08-28 | End: 2023-08-28

## 2023-08-28 RX ORDER — IOPAMIDOL 755 MG/ML
100 INJECTION, SOLUTION INTRAVASCULAR ONCE
Status: COMPLETED | OUTPATIENT
Start: 2023-08-28 | End: 2023-08-28

## 2023-08-28 RX ORDER — PROCHLORPERAZINE 25 MG
25 SUPPOSITORY, RECTAL RECTAL EVERY 12 HOURS PRN
Status: DISCONTINUED | OUTPATIENT
Start: 2023-08-28 | End: 2023-08-29 | Stop reason: HOSPADM

## 2023-08-28 RX ORDER — LIDOCAINE 40 MG/G
CREAM TOPICAL
Status: DISCONTINUED | OUTPATIENT
Start: 2023-08-28 | End: 2023-08-29 | Stop reason: HOSPADM

## 2023-08-28 RX ORDER — NALOXONE HYDROCHLORIDE 0.4 MG/ML
0.2 INJECTION, SOLUTION INTRAMUSCULAR; INTRAVENOUS; SUBCUTANEOUS
Status: DISCONTINUED | OUTPATIENT
Start: 2023-08-28 | End: 2023-08-29 | Stop reason: HOSPADM

## 2023-08-28 RX ORDER — ACETAMINOPHEN 325 MG/1
325 TABLET ORAL EVERY 8 HOURS PRN
Status: DISCONTINUED | OUTPATIENT
Start: 2023-08-28 | End: 2023-08-29 | Stop reason: DRUGHIGH

## 2023-08-28 RX ORDER — CLINDAMYCIN PHOSPHATE 900 MG/50ML
900 INJECTION, SOLUTION INTRAVENOUS EVERY 8 HOURS
Status: DISCONTINUED | OUTPATIENT
Start: 2023-08-28 | End: 2023-08-29

## 2023-08-28 RX ORDER — FENTANYL CITRATE 50 UG/ML
50 INJECTION, SOLUTION INTRAMUSCULAR; INTRAVENOUS EVERY 5 MIN PRN
Status: DISCONTINUED | OUTPATIENT
Start: 2023-08-28 | End: 2023-08-28 | Stop reason: HOSPADM

## 2023-08-28 RX ORDER — LABETALOL HYDROCHLORIDE 5 MG/ML
10 INJECTION, SOLUTION INTRAVENOUS
Status: DISCONTINUED | OUTPATIENT
Start: 2023-08-28 | End: 2023-08-28 | Stop reason: HOSPADM

## 2023-08-28 RX ORDER — PROPOFOL 10 MG/ML
INJECTION, EMULSION INTRAVENOUS PRN
Status: DISCONTINUED | OUTPATIENT
Start: 2023-08-28 | End: 2023-08-28

## 2023-08-28 RX ORDER — HYDRALAZINE HYDROCHLORIDE 20 MG/ML
2.5-5 INJECTION INTRAMUSCULAR; INTRAVENOUS EVERY 10 MIN PRN
Status: DISCONTINUED | OUTPATIENT
Start: 2023-08-28 | End: 2023-08-28 | Stop reason: HOSPADM

## 2023-08-28 RX ORDER — HYDROMORPHONE HCL IN WATER/PF 6 MG/30 ML
0.2 PATIENT CONTROLLED ANALGESIA SYRINGE INTRAVENOUS EVERY 5 MIN PRN
Status: DISCONTINUED | OUTPATIENT
Start: 2023-08-28 | End: 2023-08-28 | Stop reason: HOSPADM

## 2023-08-28 RX ORDER — PIPERACILLIN SODIUM, TAZOBACTAM SODIUM 3; .375 G/15ML; G/15ML
3.38 INJECTION, POWDER, LYOPHILIZED, FOR SOLUTION INTRAVENOUS EVERY 6 HOURS
Status: DISCONTINUED | OUTPATIENT
Start: 2023-08-28 | End: 2023-08-29

## 2023-08-28 RX ORDER — HYDROMORPHONE HCL IN WATER/PF 6 MG/30 ML
0.4 PATIENT CONTROLLED ANALGESIA SYRINGE INTRAVENOUS EVERY 5 MIN PRN
Status: DISCONTINUED | OUTPATIENT
Start: 2023-08-28 | End: 2023-08-28 | Stop reason: HOSPADM

## 2023-08-28 RX ORDER — ONDANSETRON 4 MG/1
4 TABLET, ORALLY DISINTEGRATING ORAL EVERY 6 HOURS PRN
Status: DISCONTINUED | OUTPATIENT
Start: 2023-08-28 | End: 2023-08-29 | Stop reason: HOSPADM

## 2023-08-28 RX ORDER — KETOROLAC TROMETHAMINE 30 MG/ML
INJECTION, SOLUTION INTRAMUSCULAR; INTRAVENOUS PRN
Status: DISCONTINUED | OUTPATIENT
Start: 2023-08-28 | End: 2023-08-28

## 2023-08-28 RX ORDER — ACETAMINOPHEN 500 MG
1000 TABLET ORAL ONCE
Status: DISCONTINUED | OUTPATIENT
Start: 2023-08-28 | End: 2023-08-28 | Stop reason: HOSPADM

## 2023-08-28 RX ORDER — LIDOCAINE HYDROCHLORIDE 20 MG/ML
INJECTION, SOLUTION INFILTRATION; PERINEURAL PRN
Status: DISCONTINUED | OUTPATIENT
Start: 2023-08-28 | End: 2023-08-28

## 2023-08-28 RX ORDER — ESMOLOL HYDROCHLORIDE 10 MG/ML
INJECTION INTRAVENOUS PRN
Status: DISCONTINUED | OUTPATIENT
Start: 2023-08-28 | End: 2023-08-28

## 2023-08-28 RX ORDER — FENTANYL CITRATE 50 UG/ML
25 INJECTION, SOLUTION INTRAMUSCULAR; INTRAVENOUS EVERY 5 MIN PRN
Status: DISCONTINUED | OUTPATIENT
Start: 2023-08-28 | End: 2023-08-28 | Stop reason: HOSPADM

## 2023-08-28 RX ORDER — OXYCODONE HYDROCHLORIDE 5 MG/1
5-10 TABLET ORAL
Status: DISCONTINUED | OUTPATIENT
Start: 2023-08-28 | End: 2023-08-29

## 2023-08-28 RX ORDER — BISACODYL 10 MG
10 SUPPOSITORY, RECTAL RECTAL DAILY PRN
Status: DISCONTINUED | OUTPATIENT
Start: 2023-08-28 | End: 2023-08-29

## 2023-08-28 RX ORDER — HYDROMORPHONE HCL IN WATER/PF 6 MG/30 ML
0.2 PATIENT CONTROLLED ANALGESIA SYRINGE INTRAVENOUS
Status: DISCONTINUED | OUTPATIENT
Start: 2023-08-28 | End: 2023-08-28

## 2023-08-28 RX ORDER — ACETAMINOPHEN 325 MG/1
650 TABLET ORAL EVERY 4 HOURS PRN
Status: DISCONTINUED | OUTPATIENT
Start: 2023-08-28 | End: 2023-08-28

## 2023-08-28 RX ORDER — DEXAMETHASONE SODIUM PHOSPHATE 4 MG/ML
INJECTION, SOLUTION INTRA-ARTICULAR; INTRALESIONAL; INTRAMUSCULAR; INTRAVENOUS; SOFT TISSUE PRN
Status: DISCONTINUED | OUTPATIENT
Start: 2023-08-28 | End: 2023-08-28

## 2023-08-28 RX ORDER — SODIUM CHLORIDE, SODIUM LACTATE, POTASSIUM CHLORIDE, CALCIUM CHLORIDE 600; 310; 30; 20 MG/100ML; MG/100ML; MG/100ML; MG/100ML
INJECTION, SOLUTION INTRAVENOUS CONTINUOUS
Status: DISCONTINUED | OUTPATIENT
Start: 2023-08-28 | End: 2023-08-28

## 2023-08-28 RX ORDER — ACETAMINOPHEN 325 MG/1
975 TABLET ORAL EVERY 8 HOURS
Status: DISCONTINUED | OUTPATIENT
Start: 2023-08-28 | End: 2023-08-29

## 2023-08-28 RX ORDER — SODIUM CHLORIDE, SODIUM LACTATE, POTASSIUM CHLORIDE, CALCIUM CHLORIDE 600; 310; 30; 20 MG/100ML; MG/100ML; MG/100ML; MG/100ML
INJECTION, SOLUTION INTRAVENOUS CONTINUOUS
Status: DISCONTINUED | OUTPATIENT
Start: 2023-08-28 | End: 2023-08-28 | Stop reason: HOSPADM

## 2023-08-28 RX ADMIN — PROPOFOL 20 MG: 10 INJECTION, EMULSION INTRAVENOUS at 19:31

## 2023-08-28 RX ADMIN — HYDROMORPHONE HYDROCHLORIDE 0.5 MG: 1 INJECTION, SOLUTION INTRAMUSCULAR; INTRAVENOUS; SUBCUTANEOUS at 19:20

## 2023-08-28 RX ADMIN — MIDAZOLAM 2 MG: 1 INJECTION INTRAMUSCULAR; INTRAVENOUS at 18:39

## 2023-08-28 RX ADMIN — Medication 50 MG: at 18:47

## 2023-08-28 RX ADMIN — FENTANYL CITRATE 50 MCG: 50 INJECTION, SOLUTION INTRAMUSCULAR; INTRAVENOUS at 19:20

## 2023-08-28 RX ADMIN — SODIUM CHLORIDE, POTASSIUM CHLORIDE, SODIUM LACTATE AND CALCIUM CHLORIDE: 600; 310; 30; 20 INJECTION, SOLUTION INTRAVENOUS at 18:47

## 2023-08-28 RX ADMIN — PROPOFOL 20 MG: 10 INJECTION, EMULSION INTRAVENOUS at 19:30

## 2023-08-28 RX ADMIN — PROPOFOL 200 MG: 10 INJECTION, EMULSION INTRAVENOUS at 18:47

## 2023-08-28 RX ADMIN — VANCOMYCIN HYDROCHLORIDE 1500 MG: 10 INJECTION, POWDER, LYOPHILIZED, FOR SOLUTION INTRAVENOUS at 18:04

## 2023-08-28 RX ADMIN — KETOROLAC TROMETHAMINE 15 MG: 30 INJECTION, SOLUTION INTRAMUSCULAR at 19:25

## 2023-08-28 RX ADMIN — PIPERACILLIN AND TAZOBACTAM 3.38 G: 3; .375 INJECTION, POWDER, LYOPHILIZED, FOR SOLUTION INTRAVENOUS at 15:50

## 2023-08-28 RX ADMIN — FENTANYL CITRATE 25 MCG: 50 INJECTION, SOLUTION INTRAMUSCULAR; INTRAVENOUS at 20:33

## 2023-08-28 RX ADMIN — SUGAMMADEX 200 MG: 100 INJECTION, SOLUTION INTRAVENOUS at 19:18

## 2023-08-28 RX ADMIN — IOPAMIDOL 76 ML: 755 INJECTION, SOLUTION INTRAVENOUS at 04:04

## 2023-08-28 RX ADMIN — SODIUM CHLORIDE, POTASSIUM CHLORIDE, SODIUM LACTATE AND CALCIUM CHLORIDE: 600; 310; 30; 20 INJECTION, SOLUTION INTRAVENOUS at 12:54

## 2023-08-28 RX ADMIN — ACETAMINOPHEN 650 MG: 325 TABLET, FILM COATED ORAL at 10:33

## 2023-08-28 RX ADMIN — CEFAZOLIN 1 G: 1 INJECTION, POWDER, FOR SOLUTION INTRAMUSCULAR; INTRAVENOUS at 12:53

## 2023-08-28 RX ADMIN — LIDOCAINE HYDROCHLORIDE 40 MG: 20 INJECTION, SOLUTION INFILTRATION; PERINEURAL at 18:47

## 2023-08-28 RX ADMIN — ONDANSETRON 4 MG: 2 INJECTION INTRAMUSCULAR; INTRAVENOUS at 19:25

## 2023-08-28 RX ADMIN — PROPOFOL 40 MG: 10 INJECTION, EMULSION INTRAVENOUS at 19:22

## 2023-08-28 RX ADMIN — OXYCODONE HYDROCHLORIDE 10 MG: 5 TABLET ORAL at 22:50

## 2023-08-28 RX ADMIN — HYDROMORPHONE HYDROCHLORIDE 0.2 MG: 0.2 INJECTION, SOLUTION INTRAMUSCULAR; INTRAVENOUS; SUBCUTANEOUS at 15:49

## 2023-08-28 RX ADMIN — CLINDAMYCIN PHOSPHATE 900 MG: 900 INJECTION, SOLUTION INTRAVENOUS at 23:56

## 2023-08-28 RX ADMIN — ESMOLOL HYDROCHLORIDE 70 MG: 10 INJECTION, SOLUTION INTRAVENOUS at 18:47

## 2023-08-28 RX ADMIN — SODIUM CHLORIDE, POTASSIUM CHLORIDE, SODIUM LACTATE AND CALCIUM CHLORIDE: 600; 310; 30; 20 INJECTION, SOLUTION INTRAVENOUS at 22:17

## 2023-08-28 RX ADMIN — SODIUM CHLORIDE, POTASSIUM CHLORIDE, SODIUM LACTATE AND CALCIUM CHLORIDE 1000 ML: 600; 310; 30; 20 INJECTION, SOLUTION INTRAVENOUS at 16:06

## 2023-08-28 RX ADMIN — ACETAMINOPHEN 975 MG: 325 TABLET, FILM COATED ORAL at 22:50

## 2023-08-28 RX ADMIN — SODIUM CHLORIDE 65 ML: 9 INJECTION, SOLUTION INTRAVENOUS at 04:05

## 2023-08-28 RX ADMIN — CLINDAMYCIN PHOSPHATE 900 MG: 900 INJECTION, SOLUTION INTRAVENOUS at 16:10

## 2023-08-28 RX ADMIN — DEXAMETHASONE SODIUM PHOSPHATE 8 MG: 4 INJECTION, SOLUTION INTRA-ARTICULAR; INTRALESIONAL; INTRAMUSCULAR; INTRAVENOUS; SOFT TISSUE at 19:06

## 2023-08-28 ASSESSMENT — ACTIVITIES OF DAILY LIVING (ADL)
ADLS_ACUITY_SCORE: 35
ADLS_ACUITY_SCORE: 31
ADLS_ACUITY_SCORE: 35
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
## 2023-08-29 VITALS
HEIGHT: 70 IN | TEMPERATURE: 97.5 F | BODY MASS INDEX: 22.23 KG/M2 | OXYGEN SATURATION: 100 % | WEIGHT: 155.3 LBS | HEART RATE: 63 BPM | SYSTOLIC BLOOD PRESSURE: 108 MMHG | DIASTOLIC BLOOD PRESSURE: 53 MMHG | RESPIRATION RATE: 16 BRPM

## 2023-08-29 LAB
ANION GAP SERPL CALCULATED.3IONS-SCNC: 9 MMOL/L (ref 7–15)
BUN SERPL-MCNC: 12.5 MG/DL (ref 6–20)
CALCIUM SERPL-MCNC: 8.9 MG/DL (ref 8.6–10)
CHLORIDE SERPL-SCNC: 102 MMOL/L (ref 98–107)
CREAT SERPL-MCNC: 0.65 MG/DL (ref 0.67–1.17)
DEPRECATED HCO3 PLAS-SCNC: 24 MMOL/L (ref 22–29)
ERYTHROCYTE [DISTWIDTH] IN BLOOD BY AUTOMATED COUNT: 12.9 % (ref 10–15)
GFR SERPL CREATININE-BSD FRML MDRD: >90 ML/MIN/1.73M2
GLUCOSE SERPL-MCNC: 162 MG/DL (ref 70–99)
GRAM STAIN RESULT: NORMAL
GRAM STAIN RESULT: NORMAL
HCT VFR BLD AUTO: 39.8 % (ref 40–53)
HGB BLD-MCNC: 13.2 G/DL (ref 13.3–17.7)
HOLD SPECIMEN: NORMAL
MAGNESIUM SERPL-MCNC: 1.9 MG/DL (ref 1.7–2.3)
MCH RBC QN AUTO: 29.9 PG (ref 26.5–33)
MCHC RBC AUTO-ENTMCNC: 33.2 G/DL (ref 31.5–36.5)
MCV RBC AUTO: 90 FL (ref 78–100)
PLATELET # BLD AUTO: 336 10E3/UL (ref 150–450)
POTASSIUM SERPL-SCNC: 4.7 MMOL/L (ref 3.4–5.3)
PROCALCITONIN SERPL IA-MCNC: 0.05 NG/ML
RBC # BLD AUTO: 4.42 10E6/UL (ref 4.4–5.9)
SODIUM SERPL-SCNC: 135 MMOL/L (ref 136–145)
WBC # BLD AUTO: 10 10E3/UL (ref 4–11)

## 2023-08-29 PROCEDURE — 83735 ASSAY OF MAGNESIUM: CPT | Performed by: THORACIC SURGERY (CARDIOTHORACIC VASCULAR SURGERY)

## 2023-08-29 PROCEDURE — 51798 US URINE CAPACITY MEASURE: CPT

## 2023-08-29 PROCEDURE — 99239 HOSP IP/OBS DSCHRG MGMT >30: CPT | Performed by: NURSE PRACTITIONER

## 2023-08-29 PROCEDURE — 96375 TX/PRO/DX INJ NEW DRUG ADDON: CPT

## 2023-08-29 PROCEDURE — 84145 PROCALCITONIN (PCT): CPT | Performed by: NURSE PRACTITIONER

## 2023-08-29 PROCEDURE — 999N000197 HC STATISTIC WOC PT EDUCATION, 0-15 MIN

## 2023-08-29 PROCEDURE — 80048 BASIC METABOLIC PNL TOTAL CA: CPT | Mod: 91 | Performed by: NURSE PRACTITIONER

## 2023-08-29 PROCEDURE — 250N000011 HC RX IP 250 OP 636: Performed by: NURSE PRACTITIONER

## 2023-08-29 PROCEDURE — 250N000011 HC RX IP 250 OP 636: Performed by: THORACIC SURGERY (CARDIOTHORACIC VASCULAR SURGERY)

## 2023-08-29 PROCEDURE — 250N000013 HC RX MED GY IP 250 OP 250 PS 637: Performed by: NURSE PRACTITIONER

## 2023-08-29 PROCEDURE — 250N000011 HC RX IP 250 OP 636: Mod: JZ

## 2023-08-29 PROCEDURE — 258N000003 HC RX IP 258 OP 636

## 2023-08-29 PROCEDURE — 250N000013 HC RX MED GY IP 250 OP 250 PS 637

## 2023-08-29 PROCEDURE — 36415 COLL VENOUS BLD VENIPUNCTURE: CPT | Performed by: NURSE PRACTITIONER

## 2023-08-29 PROCEDURE — 258N000003 HC RX IP 258 OP 636: Performed by: THORACIC SURGERY (CARDIOTHORACIC VASCULAR SURGERY)

## 2023-08-29 PROCEDURE — 96376 TX/PRO/DX INJ SAME DRUG ADON: CPT

## 2023-08-29 PROCEDURE — 85027 COMPLETE CBC AUTOMATED: CPT | Performed by: NURSE PRACTITIONER

## 2023-08-29 RX ORDER — LIDOCAINE 4 G/G
1 PATCH TOPICAL EVERY 24 HOURS
Qty: 10 PATCH | Refills: 0 | Status: SHIPPED | OUTPATIENT
Start: 2023-08-29 | End: 2023-12-08

## 2023-08-29 RX ORDER — CYCLOBENZAPRINE HCL 5 MG
5 TABLET ORAL 3 TIMES DAILY PRN
Qty: 16 TABLET | Refills: 0 | Status: SHIPPED | OUTPATIENT
Start: 2023-08-29 | End: 2023-12-08

## 2023-08-29 RX ORDER — OXYCODONE HYDROCHLORIDE 5 MG/1
5 TABLET ORAL EVERY 8 HOURS PRN
Qty: 12 TABLET | Refills: 0 | Status: SHIPPED | OUTPATIENT
Start: 2023-08-29 | End: 2023-12-08

## 2023-08-29 RX ORDER — ACETAMINOPHEN 325 MG/1
1000 TABLET ORAL EVERY 8 HOURS
Qty: 20 TABLET | Refills: 0 | Status: SHIPPED | OUTPATIENT
Start: 2023-08-29 | End: 2023-12-08

## 2023-08-29 RX ORDER — AMOXICILLIN 250 MG
2 CAPSULE ORAL 2 TIMES DAILY
Status: DISCONTINUED | OUTPATIENT
Start: 2023-08-29 | End: 2023-08-29 | Stop reason: HOSPADM

## 2023-08-29 RX ORDER — IBUPROFEN 400 MG/1
400 TABLET, FILM COATED ORAL EVERY 6 HOURS
Qty: 20 TABLET | Refills: 0 | Status: SHIPPED | OUTPATIENT
Start: 2023-08-29 | End: 2023-09-03

## 2023-08-29 RX ORDER — IBUPROFEN 400 MG/1
400 TABLET, FILM COATED ORAL EVERY 6 HOURS
Status: DISCONTINUED | OUTPATIENT
Start: 2023-08-29 | End: 2023-08-29 | Stop reason: HOSPADM

## 2023-08-29 RX ORDER — ACETAMINOPHEN 325 MG/1
975 TABLET ORAL EVERY 6 HOURS
Status: DISCONTINUED | OUTPATIENT
Start: 2023-08-29 | End: 2023-08-29 | Stop reason: HOSPADM

## 2023-08-29 RX ORDER — LIDOCAINE 4 G/G
1 PATCH TOPICAL
Status: DISCONTINUED | OUTPATIENT
Start: 2023-08-29 | End: 2023-08-29 | Stop reason: HOSPADM

## 2023-08-29 RX ORDER — BISACODYL 10 MG
10 SUPPOSITORY, RECTAL RECTAL DAILY PRN
Status: DISCONTINUED | OUTPATIENT
Start: 2023-08-29 | End: 2023-08-29 | Stop reason: HOSPADM

## 2023-08-29 RX ORDER — HYDROMORPHONE HCL IN WATER/PF 6 MG/30 ML
0.2 PATIENT CONTROLLED ANALGESIA SYRINGE INTRAVENOUS
Status: DISCONTINUED | OUTPATIENT
Start: 2023-08-29 | End: 2023-08-29 | Stop reason: HOSPADM

## 2023-08-29 RX ORDER — HYDROMORPHONE HCL IN WATER/PF 6 MG/30 ML
0.2 PATIENT CONTROLLED ANALGESIA SYRINGE INTRAVENOUS ONCE
Status: COMPLETED | OUTPATIENT
Start: 2023-08-29 | End: 2023-08-29

## 2023-08-29 RX ORDER — AMOXICILLIN 250 MG
2 CAPSULE ORAL 2 TIMES DAILY
Qty: 12 TABLET | Refills: 0 | Status: SHIPPED | OUTPATIENT
Start: 2023-08-29 | End: 2023-12-08

## 2023-08-29 RX ORDER — OXYCODONE HYDROCHLORIDE 5 MG/1
5 TABLET ORAL EVERY 4 HOURS PRN
Status: DISCONTINUED | OUTPATIENT
Start: 2023-08-29 | End: 2023-08-29 | Stop reason: HOSPADM

## 2023-08-29 RX ORDER — OXYCODONE HYDROCHLORIDE 5 MG/1
5 TABLET ORAL EVERY 6 HOURS PRN
Qty: 18 TABLET | Refills: 0 | Status: SHIPPED | OUTPATIENT
Start: 2023-08-29 | End: 2023-08-29

## 2023-08-29 RX ORDER — POLYETHYLENE GLYCOL 3350 17 G/17G
17 POWDER, FOR SOLUTION ORAL DAILY
Status: DISCONTINUED | OUTPATIENT
Start: 2023-08-29 | End: 2023-08-29 | Stop reason: HOSPADM

## 2023-08-29 RX ADMIN — ACETAMINOPHEN 975 MG: 325 TABLET, FILM COATED ORAL at 16:11

## 2023-08-29 RX ADMIN — HYDROMORPHONE HYDROCHLORIDE 0.2 MG: 0.2 INJECTION, SOLUTION INTRAMUSCULAR; INTRAVENOUS; SUBCUTANEOUS at 06:49

## 2023-08-29 RX ADMIN — IBUPROFEN 400 MG: 400 TABLET, FILM COATED ORAL at 16:53

## 2023-08-29 RX ADMIN — OXYCODONE HYDROCHLORIDE 5 MG: 5 TABLET ORAL at 17:00

## 2023-08-29 RX ADMIN — CLINDAMYCIN PHOSPHATE 900 MG: 900 INJECTION, SOLUTION INTRAVENOUS at 08:22

## 2023-08-29 RX ADMIN — PIPERACILLIN AND TAZOBACTAM 3.38 G: 3; .375 INJECTION, POWDER, LYOPHILIZED, FOR SOLUTION INTRAVENOUS at 10:57

## 2023-08-29 RX ADMIN — LIDOCAINE PATCH 4% 1 PATCH: 40 PATCH TOPICAL at 09:17

## 2023-08-29 RX ADMIN — PIPERACILLIN AND TAZOBACTAM 3.38 G: 3; .375 INJECTION, POWDER, LYOPHILIZED, FOR SOLUTION INTRAVENOUS at 04:31

## 2023-08-29 RX ADMIN — ACETAMINOPHEN 975 MG: 325 TABLET, FILM COATED ORAL at 08:22

## 2023-08-29 RX ADMIN — POLYETHYLENE GLYCOL 3350 17 G: 17 POWDER, FOR SOLUTION ORAL at 09:17

## 2023-08-29 RX ADMIN — SENNOSIDES AND DOCUSATE SODIUM 2 TABLET: 50; 8.6 TABLET ORAL at 09:17

## 2023-08-29 RX ADMIN — SODIUM CHLORIDE, POTASSIUM CHLORIDE, SODIUM LACTATE AND CALCIUM CHLORIDE: 600; 310; 30; 20 INJECTION, SOLUTION INTRAVENOUS at 07:46

## 2023-08-29 RX ADMIN — IBUPROFEN 400 MG: 400 TABLET, FILM COATED ORAL at 10:56

## 2023-08-29 RX ADMIN — OXYCODONE HYDROCHLORIDE 10 MG: 5 TABLET ORAL at 01:31

## 2023-08-29 RX ADMIN — POLYETHYLENE GLYCOL 3350 17 G: 17 POWDER, FOR SOLUTION ORAL at 17:00

## 2023-08-29 RX ADMIN — OXYCODONE HYDROCHLORIDE 10 MG: 5 TABLET ORAL at 08:22

## 2023-08-29 RX ADMIN — VANCOMYCIN HYDROCHLORIDE 1250 MG: 10 INJECTION, POWDER, LYOPHILIZED, FOR SOLUTION INTRAVENOUS at 09:15

## 2023-08-29 RX ADMIN — OXYCODONE HYDROCHLORIDE 5 MG: 5 TABLET ORAL at 13:03

## 2023-08-29 RX ADMIN — ACETAMINOPHEN 325 MG: 325 TABLET, FILM COATED ORAL at 01:31

## 2023-08-29 ASSESSMENT — ACTIVITIES OF DAILY LIVING (ADL)
ADLS_ACUITY_SCORE: 31
DEPENDENT_IADLS:: INDEPENDENT
ADLS_ACUITY_SCORE: 31

## 2023-08-29 NOTE — ANESTHESIA PROCEDURE NOTES
Airway       Patient location during procedure: OR       Procedure Start/Stop Times: 8/28/2023 6:51 PM  Staff -        Anesthesiologist:  Gregorio Carter MD       Resident/Fellow: Liam Butterfield MD       Performed By: resident  Consent for Airway        Urgency: elective  Indications and Patient Condition       Indications for airway management: karl-procedural       Induction type:intravenous       Mask difficulty assessment: 1 - vent by mask    Final Airway Details       Final airway type: endotracheal airway       Successful airway: ETT - single  Endotracheal Airway Details        ETT size (mm): 7.5       Cuffed: yes       Successful intubation technique: direct laryngoscopy       DL Blade Type: Sanders 2       Grade View of Cords: 1       Adjucts: stylet       Position: Center       Measured from: gums/teeth       Secured at (cm): 24       Bite block used: None    Post intubation assessment        Number of attempts at approach: 1       Number of other approaches attempted: 0       Secured with: pink tape       Ease of procedure: easy       Dentition: Intact and Unchanged    Medication(s) Administered   Medication Administration Time: 8/28/2023 6:51 PM
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
## 2023-08-29 NOTE — CONSULTS
Care Management Initial Consult    General Information  Assessment completed with: Patient, Care Team Member, -chart review,    Type of CM/SW Visit: Initial Assessment    Primary Care Provider verified and updated as needed: Yes   Readmission within the last 30 days: other (see comments) (Seen at [facility] 5 days ago)   Return Category: Post-op/Post-procedure complication  Reason for Consult: discharge planning  Advance Care Planning:            Communication Assessment  Patient's communication style: spoken language (English or Bilingual)    Hearing Difficulty or Deaf: no        Cognitive  Cognitive/Neuro/Behavioral: WDL                      Living Environment:   People in home: alone (Per pt he plans to stay with his sister and dad post hospitalization)     Current living Arrangements: apartment      Able to return to prior arrangements: yes       Family/Social Support:  Care provided by: self  Provides care for: no one  Marital Status: Single  Sibling(s), Parent(s)          Description of Support System: Supportive    Support Assessment: Adequate family and caregiver support    Current Resources:   Patient receiving home care services: No     Community Resources: None  Equipment currently used at home:    Supplies currently used at home: None    Employment/Financial:  Employment Status: employed full-time (Works as a  but currently not working)        Financial Concerns: No concerns identified           Does the patient's insurance plan have a 3 day qualifying hospital stay waiver?  No    Lifestyle & Psychosocial Needs:  Social Determinants of Health     Tobacco Use: Not on file   Alcohol Use: Not on file   Financial Resource Strain: Not on file   Food Insecurity: Not on file   Transportation Needs: Not on file   Physical Activity: Not on file   Stress: Not on file   Social Connections: Not on file   Intimate Partner Violence: Not on file   Depression: Not on file   Housing Stability: Not on file        Functional Status:  Prior to admission patient needed assistance:   Dependent ADLs:: Independent  Dependent IADLs:: Independent       Mental Health Status:  Mental Health Status: Other (see comment) (Pt notes concerns with anxiety.  pt would be interested in a referral for [facility] pychology support)       Chemical Dependency Status:  Chemical Dependency Status: No Current Concerns             Values/Beliefs:  Spiritual, Cultural Beliefs, Religion Practices, Values that affect care: no               Additional Information:    Notified by ДМИТРИЙ Howe concern noted with pt ability to manage wound care.   Pt will require daily wound care.  Wound will require packing.  Per chart review pt s/p I & D.  Wound does requiring packing.  Wound care orders for discharge pending.  Bedside RN did ask team for WOC consult.      Met with pt.  Introduced RNCC role.  Per discussion with pt he is normally independent with all his care needs, works full time as a  but currently not working.  Pt plans to stay with his father and sister.  Per pt his father and sister will be able to assist him with wound care management.  Pt notes he would be able to learn to manage the wound on his own.  Pt confirmed PCP is Dr. Alfonso at [facility].  Pt notes no concerns regarding anticipated plan for discharge to home.      Vannesas Catalan RN BSN, PHN, ACM-RN  7A RN Care Coordinator  Phone: [phone]  Pager [phone]    To contact the weekend RNCC  [place] (0800 - 1630) Saturday and Sunday    Units: 5A,5B,5C [phone]    Units: 6A, [phone]    Units 6B, 6C, 6D [phone]    Units: 7A, 7B, 7C, 7D, [phone]    [facility] ([phone]) Saturday and Sunday    Units: 5 Ortho, 8A, 10 ICU, & Pediatric Units-Pager 4: [phone]    8/29/2023 12:28 PM

## 2023-08-29 NOTE — PLAN OF CARE
"/77 (BP Location: Left arm)   Pulse 62   Temp 97.5  F (36.4  C) (Axillary)   Resp 16   Ht 1.778 m (5' 10\")   Wt 70.4 kg (155 lb 4.8 oz)   SpO2 96%   BMI 22.28 kg/m      SHIFT: [id number]  ISOLATION: NA  VITALS: AVSS on RA  BG: NA  Recent Labs   Lab 08/28/23  1735 08/28/23  0904 08/28/23  0329   GLC 75 93 109*   NEURO: A&O x4  DIET: Regular diet (AAT)  PAIN: Pain in wound managed with tylenol and oxycodone. No reported nausea.  RESPIRATORY: WDL  CARDIAC: WDL  : Voided 650 mLs of clear, yellow urine.  GI: LBM: 08/27.  TUBES: Bilateral PIVs, L infusing.  MIVF/GTT/ABX: LR @ 75 mL/hr.  ASSIST: UAL  SAFETY: NA  SKIN: Wound dressed and UTV.   LABS:   Recent Labs   Lab Test 08/28/23  2234 08/28/23  0904 08/28/23  0329   POTASSIUM  --  4.1 3.5   HGB 12.6* 11.4* 11.2*   PLAN: Follow POC per trauma team recommendations.    "
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
## 2023-08-29 NOTE — PLAN OF CARE
"/53 (BP Location: Right arm)   Pulse 63   Temp 97.5  F (36.4  C) (Oral)   Resp 16   Ht 1.778 m (5' 10\")   Wt 70.4 kg (155 lb 4.8 oz)   SpO2 100%   BMI 22.28 kg/m     Neuro: A/Ox4  VS: VSS on RA  Pain: c/o L chest wound pain treated by scheduled Tylenol and Ibuprofen  GI: on regular diet and tolerating good. Denies nausea. No BM and took scheduled senna and Miralax  : Voiding without difficulty  PIV x2 SL  L upper chest wound dressing changed with 1 packing. L front scab intact   Activity - SBA  Education - pain/bowel management. This writer called pt's sister Bhavna requesting her presence for wound care teaching but Bhavna didn't pickup the call  Plan of Care - Continue with POC    "
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
## 2023-08-29 NOTE — BRIEF OP NOTE
Brief Operative Note.    Pre-operative diagnosis: Possible deep soft tissue infection, left neck, left chest wall, and left flank    Post-operative diagnosis: Possible deep soft tissue infection left neck, left chest wall, and left flank    Procedure: Incision and inspection of the left neck, left chest wall, and left flank    Staff: Mynor Hernandez MD, PhD    Resident Surgeons: none.      Findings; No necrosis,     Specimen: samples of the left chest wall fascia taken for culture, Gram stain , and permanent histological examination.      Drains: none    Complications: none noted.     Mynor Hernandez MD, PhD
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
## 2023-08-29 NOTE — ANESTHESIA POSTPROCEDURE EVALUATION
Patient: Keke Atkins    Procedure: Procedure(s):  Irrigation and debridement left chest washout, combined       Anesthesia Type:  General    Note:  Disposition: Admission; Inpatient   Postop Pain Control: Uneventful            Sign Out: Well controlled pain   PONV: No   Neuro/Psych: Uneventful            Sign Out: Acceptable/Baseline neuro status   Airway/Respiratory: Uneventful            Sign Out: Acceptable/Baseline resp. status   CV/Hemodynamics: Uneventful            Sign Out: Acceptable CV status; No obvious hypovolemia; No obvious fluid overload   Other NRE: NONE   DID A NON-ROUTINE EVENT OCCUR? No           Last vitals:  Vitals Value Taken Time   /65 08/28/23 2045   Temp 37  C (98.6  F) 08/28/23 2045   Pulse 54 08/28/23 2059   Resp 14 08/28/23 2059   SpO2 99 % 08/28/23 2059   Vitals shown include unvalidated device data.    Electronically Signed By: Liam Butterfield MD  August 28, 2023  9:00 PM
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
## 2023-08-29 NOTE — PHARMACY-ADMISSION MEDICATION HISTORY
Pharmacist Admission Medication History    Admission medication history is complete. The information provided in this note is only as accurate as the sources available at the time of the update.    Medication reconciliation/reorder completed by provider prior to medication history? Yes    Information Source(s): Patient via in-person    Pertinent Information: Patient states he is taking no prescription or OTC medications prior to admit.    Changes made to PTA medication list:  Added: None  Deleted: None  Changed: None    Medication Affordability:  Not including over the counter (OTC) medications, was there a time in the past 3 months when you did not take your medications as prescribed because of cost?: No    Allergies reviewed with patient and updates made in EHR: yes    Medication History Completed By: Kj Carrasquillo RP 8/29/2023 8:51 AM    Prior to Admission medications    Not on File

## 2023-08-30 ENCOUNTER — PATIENT OUTREACH (OUTPATIENT)
Dept: SURGERY | Facility: CLINIC | Age: 28
End: 2023-08-30
Payer: COMMERCIAL

## 2023-08-30 RX ORDER — SULFAMETHOXAZOLE/TRIMETHOPRIM 800-160 MG
1 TABLET ORAL 2 TIMES DAILY
Qty: 10 TABLET | Refills: 0 | Status: SHIPPED | OUTPATIENT
Start: 2023-08-30 | End: 2023-09-04

## 2023-08-30 NOTE — PROGRESS NOTES
08/30/23    9:24 AM     Keke Atkins is a patient of Dr. Mynor Hernandez that underwent  Incision and inspection of the left neck, left chest wall, and left flank  approximately 2 days ago (8/28).  Attempted to contact patient via telephone for a status update and review post-op  teaching.  LM on  to call office.  Await return call.      Of note:  Pathology:  Pending  Wound:  BID wet to dry dressing changes  Follow-up:   Scheduled with Kenisha ZHOU on 9/8 at 0730 and with Dr. Hernandez on 9/14 at 0830  Restrictions:  - No lifting, pushing, pulling more than 15-20 pounds for 3-4 weeks  New medications:  Tylenol, ibuprofen, lidocaine patch, Senokot, Flexeril, oxycodone  Equipment/Supplies:  Wound care supplies:  saline, gauze, kerlix, tape

## 2023-08-30 NOTE — PROGRESS NOTES
Px. Is discharging today. Discharge instructions given and was understood. Waiting for his sister to pick him up and for wound dressing instruction. Continue with POC.
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
## 2023-08-30 NOTE — CONFIDENTIAL NOTE
"Wound cultures 1+ staph aureus,   Attempted to call patient regarding updated results @ [phone], no answer.   Called sister (Bhavna) @ [phone]. \" Prescription for Bactrim DS x 5 days will be sent to the [facility] on [address] [facility]\". Sister states she will  prescription for patient.    Discussed with Dr. Hernandez  Follow up appointments  have been scheduled with the [facility] on 09/08 and Dr. Hernandez 09/14.    Iris Gibbs CNP  Acute Care Trauma NP   [facility]    "

## 2023-08-31 LAB
BACTERIA TISS BX CULT: ABNORMAL
PATH REPORT.COMMENTS IMP SPEC: NORMAL
PATH REPORT.COMMENTS IMP SPEC: NORMAL
PATH REPORT.FINAL DX SPEC: NORMAL
PATH REPORT.GROSS SPEC: NORMAL
PATH REPORT.MICROSCOPIC SPEC OTHER STN: NORMAL
PATH REPORT.RELEVANT HX SPEC: NORMAL
PHOTO IMAGE: NORMAL

## 2023-09-05 LAB — BACTERIA TISS BX CULT: NORMAL

## 2023-12-08 ENCOUNTER — HOSPITAL ENCOUNTER (INPATIENT)
Facility: CLINIC | Age: 28
LOS: 2 days | Discharge: HOME | End: 2023-12-10
Attending: FAMILY MEDICINE | Admitting: FAMILY MEDICINE
Payer: COMMERCIAL

## 2023-12-08 ENCOUNTER — APPOINTMENT (OUTPATIENT)
Dept: CT IMAGING | Facility: CLINIC | Age: 28
End: 2023-12-08
Attending: FAMILY MEDICINE
Payer: COMMERCIAL

## 2023-12-08 ENCOUNTER — APPOINTMENT (OUTPATIENT)
Dept: GENERAL RADIOLOGY | Facility: CLINIC | Age: 28
End: 2023-12-08
Attending: FAMILY MEDICINE
Payer: COMMERCIAL

## 2023-12-08 DIAGNOSIS — J18.9 PNEUMONIA OF LEFT LOWER LOBE DUE TO INFECTIOUS ORGANISM: ICD-10-CM

## 2023-12-08 LAB
ALBUMIN SERPL BCG-MCNC: 4 G/DL (ref 3.5–5.2)
ALBUMIN UR-MCNC: 10 MG/DL
ALP SERPL-CCNC: 54 U/L (ref 40–150)
ALT SERPL W P-5'-P-CCNC: 41 U/L (ref 0–70)
ANION GAP SERPL CALCULATED.3IONS-SCNC: 8 MMOL/L (ref 7–15)
APPEARANCE UR: CLEAR
AST SERPL W P-5'-P-CCNC: 28 U/L (ref 0–45)
BASOPHILS # BLD AUTO: ABNORMAL 10*3/UL
BASOPHILS # BLD MANUAL: 0.3 10E3/UL (ref 0–0.2)
BASOPHILS NFR BLD AUTO: ABNORMAL %
BASOPHILS NFR BLD MANUAL: 1 %
BILIRUB SERPL-MCNC: 0.5 MG/DL
BILIRUB UR QL STRIP: NEGATIVE
BUN SERPL-MCNC: 16.5 MG/DL (ref 6–20)
CALCIUM SERPL-MCNC: 9.1 MG/DL (ref 8.6–10)
CHLORIDE SERPL-SCNC: 97 MMOL/L (ref 98–107)
COLOR UR AUTO: ABNORMAL
CREAT SERPL-MCNC: 0.8 MG/DL (ref 0.67–1.17)
CRP SERPL-MCNC: 129.57 MG/L
DEPRECATED HCO3 PLAS-SCNC: 26 MMOL/L (ref 22–29)
EGFRCR SERPLBLD CKD-EPI 2021: >90 ML/MIN/1.73M2
EOSINOPHIL # BLD AUTO: ABNORMAL 10*3/UL
EOSINOPHIL # BLD MANUAL: 0 10E3/UL (ref 0–0.7)
EOSINOPHIL NFR BLD AUTO: ABNORMAL %
EOSINOPHIL NFR BLD MANUAL: 0 %
ERYTHROCYTE [DISTWIDTH] IN BLOOD BY AUTOMATED COUNT: 13.6 % (ref 10–15)
FLUAV RNA SPEC QL NAA+PROBE: NEGATIVE
FLUBV RNA RESP QL NAA+PROBE: NEGATIVE
GLUCOSE SERPL-MCNC: 107 MG/DL (ref 70–99)
GLUCOSE UR STRIP-MCNC: NEGATIVE MG/DL
HCT VFR BLD AUTO: 38.6 % (ref 40–53)
HGB BLD-MCNC: 13.1 G/DL (ref 13.3–17.7)
HGB UR QL STRIP: NEGATIVE
IMM GRANULOCYTES # BLD: ABNORMAL 10*3/UL
IMM GRANULOCYTES NFR BLD: ABNORMAL %
KETONES UR STRIP-MCNC: NEGATIVE MG/DL
LACTATE SERPL-SCNC: 1.3 MMOL/L (ref 0.7–2)
LEUKOCYTE ESTERASE UR QL STRIP: NEGATIVE
LYMPHOCYTES # BLD AUTO: ABNORMAL 10*3/UL
LYMPHOCYTES # BLD MANUAL: 1 10E3/UL (ref 0.8–5.3)
LYMPHOCYTES NFR BLD AUTO: ABNORMAL %
LYMPHOCYTES NFR BLD MANUAL: 4 %
MCH RBC QN AUTO: 30.2 PG (ref 26.5–33)
MCHC RBC AUTO-ENTMCNC: 33.9 G/DL (ref 31.5–36.5)
MCV RBC AUTO: 89 FL (ref 78–100)
MONOCYTES # BLD AUTO: ABNORMAL 10*3/UL
MONOCYTES # BLD MANUAL: 2.8 10E3/UL (ref 0–1.3)
MONOCYTES NFR BLD AUTO: ABNORMAL %
MONOCYTES NFR BLD MANUAL: 11 %
MUCOUS THREADS #/AREA URNS LPF: PRESENT /LPF
MYELOCYTES # BLD MANUAL: 0.3 10E3/UL
MYELOCYTES NFR BLD MANUAL: 1 %
NEUTROPHILS # BLD AUTO: ABNORMAL 10*3/UL
NEUTROPHILS # BLD MANUAL: 21.2 10E3/UL (ref 1.6–8.3)
NEUTROPHILS NFR BLD AUTO: ABNORMAL %
NEUTROPHILS NFR BLD MANUAL: 83 %
NITRATE UR QL: NEGATIVE
NRBC # BLD AUTO: 0 10E3/UL
NRBC BLD AUTO-RTO: 0 /100
PH UR STRIP: 8 [PH] (ref 5–7)
PLAT MORPH BLD: ABNORMAL
PLATELET # BLD AUTO: 198 10E3/UL (ref 150–450)
POTASSIUM SERPL-SCNC: 4 MMOL/L (ref 3.4–5.3)
PROCALCITONIN SERPL IA-MCNC: 1.43 NG/ML
PROT SERPL-MCNC: 7.7 G/DL (ref 6.4–8.3)
RBC # BLD AUTO: 4.34 10E6/UL (ref 4.4–5.9)
RBC MORPH BLD: ABNORMAL
RBC URINE: 0 /HPF
RSV RNA SPEC NAA+PROBE: NEGATIVE
SARS-COV-2 RNA RESP QL NAA+PROBE: NEGATIVE
SODIUM SERPL-SCNC: 131 MMOL/L (ref 135–145)
SP GR UR STRIP: 1.02 (ref 1–1.03)
UROBILINOGEN UR STRIP-MCNC: NORMAL MG/DL
WBC # BLD AUTO: 25.6 10E3/UL (ref 4–11)
WBC URINE: 2 /HPF

## 2023-12-08 PROCEDURE — 85027 COMPLETE CBC AUTOMATED: CPT | Performed by: FAMILY MEDICINE

## 2023-12-08 PROCEDURE — 87181 SC STD AGAR DILUTION PER AGT: CPT | Performed by: FAMILY MEDICINE

## 2023-12-08 PROCEDURE — 258N000003 HC RX IP 258 OP 636: Performed by: FAMILY MEDICINE

## 2023-12-08 PROCEDURE — 96361 HYDRATE IV INFUSION ADD-ON: CPT | Performed by: FAMILY MEDICINE

## 2023-12-08 PROCEDURE — 99285 EMERGENCY DEPT VISIT HI MDM: CPT | Mod: 25 | Performed by: FAMILY MEDICINE

## 2023-12-08 PROCEDURE — 96368 THER/DIAG CONCURRENT INF: CPT | Performed by: FAMILY MEDICINE

## 2023-12-08 PROCEDURE — 84484 ASSAY OF TROPONIN QUANT: CPT

## 2023-12-08 PROCEDURE — 96375 TX/PRO/DX INJ NEW DRUG ADDON: CPT | Performed by: FAMILY MEDICINE

## 2023-12-08 PROCEDURE — 93010 ELECTROCARDIOGRAM REPORT: CPT | Performed by: FAMILY MEDICINE

## 2023-12-08 PROCEDURE — G0378 HOSPITAL OBSERVATION PER HR: HCPCS

## 2023-12-08 PROCEDURE — 250N000011 HC RX IP 250 OP 636: Performed by: FAMILY MEDICINE

## 2023-12-08 PROCEDURE — 250N000013 HC RX MED GY IP 250 OP 250 PS 637: Performed by: STUDENT IN AN ORGANIZED HEALTH CARE EDUCATION/TRAINING PROGRAM

## 2023-12-08 PROCEDURE — 120N000002 HC R&B MED SURG/OB UMMC

## 2023-12-08 PROCEDURE — 87086 URINE CULTURE/COLONY COUNT: CPT | Performed by: FAMILY MEDICINE

## 2023-12-08 PROCEDURE — 83605 ASSAY OF LACTIC ACID: CPT | Performed by: FAMILY MEDICINE

## 2023-12-08 PROCEDURE — 80053 COMPREHEN METABOLIC PANEL: CPT | Performed by: FAMILY MEDICINE

## 2023-12-08 PROCEDURE — 93005 ELECTROCARDIOGRAM TRACING: CPT

## 2023-12-08 PROCEDURE — 93005 ELECTROCARDIOGRAM TRACING: CPT | Performed by: FAMILY MEDICINE

## 2023-12-08 PROCEDURE — 85007 BL SMEAR W/DIFF WBC COUNT: CPT | Performed by: FAMILY MEDICINE

## 2023-12-08 PROCEDURE — 86140 C-REACTIVE PROTEIN: CPT | Performed by: STUDENT IN AN ORGANIZED HEALTH CARE EDUCATION/TRAINING PROGRAM

## 2023-12-08 PROCEDURE — 80307 DRUG TEST PRSMV CHEM ANLYZR: CPT

## 2023-12-08 PROCEDURE — 84145 PROCALCITONIN (PCT): CPT | Performed by: FAMILY MEDICINE

## 2023-12-08 PROCEDURE — 99222 1ST HOSP IP/OBS MODERATE 55: CPT | Mod: AI | Performed by: STUDENT IN AN ORGANIZED HEALTH CARE EDUCATION/TRAINING PROGRAM

## 2023-12-08 PROCEDURE — 87637 SARSCOV2&INF A&B&RSV AMP PRB: CPT | Performed by: FAMILY MEDICINE

## 2023-12-08 PROCEDURE — 96365 THER/PROPH/DIAG IV INF INIT: CPT | Performed by: FAMILY MEDICINE

## 2023-12-08 PROCEDURE — 87149 DNA/RNA DIRECT PROBE: CPT | Performed by: FAMILY MEDICINE

## 2023-12-08 PROCEDURE — 71046 X-RAY EXAM CHEST 2 VIEWS: CPT

## 2023-12-08 PROCEDURE — 74176 CT ABD & PELVIS W/O CONTRAST: CPT

## 2023-12-08 PROCEDURE — 250N000013 HC RX MED GY IP 250 OP 250 PS 637: Performed by: FAMILY MEDICINE

## 2023-12-08 PROCEDURE — 81001 URINALYSIS AUTO W/SCOPE: CPT | Performed by: FAMILY MEDICINE

## 2023-12-08 PROCEDURE — 36415 COLL VENOUS BLD VENIPUNCTURE: CPT | Performed by: FAMILY MEDICINE

## 2023-12-08 RX ORDER — HYDROXYZINE HYDROCHLORIDE 25 MG/1
50 TABLET, FILM COATED ORAL EVERY 6 HOURS PRN
Status: DISCONTINUED | OUTPATIENT
Start: 2023-12-08 | End: 2023-12-09

## 2023-12-08 RX ORDER — CALCIUM CARBONATE 500 MG/1
1000 TABLET, CHEWABLE ORAL 4 TIMES DAILY PRN
Status: DISCONTINUED | OUTPATIENT
Start: 2023-12-08 | End: 2023-12-10 | Stop reason: HOSPADM

## 2023-12-08 RX ORDER — AZITHROMYCIN 500 MG/5ML
500 INJECTION, POWDER, LYOPHILIZED, FOR SOLUTION INTRAVENOUS ONCE
Status: COMPLETED | OUTPATIENT
Start: 2023-12-08 | End: 2023-12-08

## 2023-12-08 RX ORDER — AMOXICILLIN 250 MG
1 CAPSULE ORAL 2 TIMES DAILY PRN
Status: DISCONTINUED | OUTPATIENT
Start: 2023-12-08 | End: 2023-12-10 | Stop reason: HOSPADM

## 2023-12-08 RX ORDER — LIDOCAINE 40 MG/G
CREAM TOPICAL
Status: DISCONTINUED | OUTPATIENT
Start: 2023-12-08 | End: 2023-12-10 | Stop reason: HOSPADM

## 2023-12-08 RX ORDER — HYDROXYZINE HYDROCHLORIDE 25 MG/1
25 TABLET, FILM COATED ORAL EVERY 6 HOURS PRN
Status: DISCONTINUED | OUTPATIENT
Start: 2023-12-08 | End: 2023-12-09

## 2023-12-08 RX ORDER — AZITHROMYCIN 500 MG/5ML
500 INJECTION, POWDER, LYOPHILIZED, FOR SOLUTION INTRAVENOUS EVERY 24 HOURS
Status: DISCONTINUED | OUTPATIENT
Start: 2023-12-09 | End: 2023-12-09

## 2023-12-08 RX ORDER — SALIVA STIMULANT COMB. NO.3
1 SPRAY, NON-AEROSOL (ML) MUCOUS MEMBRANE 4 TIMES DAILY
Status: DISCONTINUED | OUTPATIENT
Start: 2023-12-08 | End: 2023-12-10 | Stop reason: HOSPADM

## 2023-12-08 RX ORDER — CEFTRIAXONE 2 G/1
2 INJECTION, POWDER, FOR SOLUTION INTRAMUSCULAR; INTRAVENOUS ONCE
Status: COMPLETED | OUTPATIENT
Start: 2023-12-08 | End: 2023-12-08

## 2023-12-08 RX ORDER — ONDANSETRON 2 MG/ML
4 INJECTION INTRAMUSCULAR; INTRAVENOUS EVERY 6 HOURS PRN
Status: DISCONTINUED | OUTPATIENT
Start: 2023-12-08 | End: 2023-12-10 | Stop reason: HOSPADM

## 2023-12-08 RX ORDER — GUAIFENESIN 200 MG/10ML
200 LIQUID ORAL EVERY 4 HOURS PRN
Status: DISCONTINUED | OUTPATIENT
Start: 2023-12-08 | End: 2023-12-10 | Stop reason: HOSPADM

## 2023-12-08 RX ORDER — ONDANSETRON 4 MG/1
4 TABLET, ORALLY DISINTEGRATING ORAL EVERY 6 HOURS PRN
Status: DISCONTINUED | OUTPATIENT
Start: 2023-12-08 | End: 2023-12-10 | Stop reason: HOSPADM

## 2023-12-08 RX ORDER — KETOROLAC TROMETHAMINE 30 MG/ML
30 INJECTION, SOLUTION INTRAMUSCULAR; INTRAVENOUS ONCE
Status: COMPLETED | OUTPATIENT
Start: 2023-12-08 | End: 2023-12-08

## 2023-12-08 RX ORDER — AMOXICILLIN 250 MG
2 CAPSULE ORAL 2 TIMES DAILY PRN
Status: DISCONTINUED | OUTPATIENT
Start: 2023-12-08 | End: 2023-12-10 | Stop reason: HOSPADM

## 2023-12-08 RX ORDER — IBUPROFEN 600 MG/1
600 TABLET, FILM COATED ORAL EVERY 6 HOURS PRN
Status: DISCONTINUED | OUTPATIENT
Start: 2023-12-08 | End: 2023-12-10 | Stop reason: HOSPADM

## 2023-12-08 RX ORDER — ACETAMINOPHEN 325 MG/1
650 TABLET ORAL EVERY 4 HOURS PRN
Status: DISCONTINUED | OUTPATIENT
Start: 2023-12-08 | End: 2023-12-10 | Stop reason: HOSPADM

## 2023-12-08 RX ORDER — ACETAMINOPHEN 500 MG
1000 TABLET ORAL ONCE
Status: COMPLETED | OUTPATIENT
Start: 2023-12-08 | End: 2023-12-08

## 2023-12-08 RX ORDER — ACETAMINOPHEN 650 MG/1
650 SUPPOSITORY RECTAL EVERY 4 HOURS PRN
Status: DISCONTINUED | OUTPATIENT
Start: 2023-12-08 | End: 2023-12-10 | Stop reason: HOSPADM

## 2023-12-08 RX ORDER — CEFTRIAXONE 2 G/1
2 INJECTION, POWDER, FOR SOLUTION INTRAMUSCULAR; INTRAVENOUS EVERY 24 HOURS
Status: DISCONTINUED | OUTPATIENT
Start: 2023-12-09 | End: 2023-12-09

## 2023-12-08 RX ADMIN — KETOROLAC TROMETHAMINE 30 MG: 30 INJECTION, SOLUTION INTRAMUSCULAR; INTRAVENOUS at 14:45

## 2023-12-08 RX ADMIN — ACETAMINOPHEN 650 MG: 325 TABLET, FILM COATED ORAL at 20:01

## 2023-12-08 RX ADMIN — ACETAMINOPHEN 1000 MG: 500 TABLET ORAL at 14:28

## 2023-12-08 RX ADMIN — HYDROXYZINE HYDROCHLORIDE 25 MG: 25 TABLET, FILM COATED ORAL at 20:01

## 2023-12-08 RX ADMIN — AZITHROMYCIN MONOHYDRATE 500 MG: 500 INJECTION, POWDER, LYOPHILIZED, FOR SOLUTION INTRAVENOUS at 17:32

## 2023-12-08 RX ADMIN — Medication 1 SPRAY: at 20:03

## 2023-12-08 RX ADMIN — CEFTRIAXONE SODIUM 2 G: 2 INJECTION, POWDER, FOR SOLUTION INTRAMUSCULAR; INTRAVENOUS at 17:11

## 2023-12-08 RX ADMIN — SODIUM CHLORIDE 1000 ML: 9 INJECTION, SOLUTION INTRAVENOUS at 14:46

## 2023-12-08 ASSESSMENT — ACTIVITIES OF DAILY LIVING (ADL)
ADLS_ACUITY_SCORE: 35

## 2023-12-08 NOTE — ED PROVIDER NOTES
ED Provider Note  [facility]      History     Chief Complaint   Patient presents with    Generalized Body Aches     The history is provided by the patient and medical records.     Keke Atkins is a 28 year old male who presents the emergency room with fevers and left-sided chest wall and abdominal pain patient states that he has been having increased symptoms over the past 3 to 4 days no longer able to sleep has generalized malaise and describes body aches as well.    Past Medical History  No past medical history on file.  Past Surgical History:   Procedure Laterality Date    IRRIGATION AND DEBRIDEMENT CHEST WASHOUT, COMBINED Left 8/28/2023    Procedure: Irrigation and debridement left chest washout, combined;  Surgeon: Mynor Hernandez MD;  Location: [facility] OR     amoxicillin (AMOXIL) 500 MG tablet  azithromycin (ZITHROMAX) 250 MG tablet  guaiFENesin (ROBITUSSIN) 20 mg/mL liquid      No Known Allergies  Family History  No family history on file.  Social History          A medically appropriate review of systems was performed with pertinent positives and negatives noted in the HPI, and all other systems negative.    Physical Exam   BP: 129/83  Pulse: 98  Temp: (!) 102.7  F (39.3  C)  Resp: 24  Weight: 68.6 kg (151 lb 3.2 oz)  SpO2: 95 %  Physical Exam  Vitals and nursing note reviewed.   Constitutional:       General: He is not in acute distress.     Appearance: Normal appearance. He is not toxic-appearing.   HENT:      Head: Atraumatic.   Eyes:      General: No scleral icterus.     Conjunctiva/sclera: Conjunctivae normal.   Cardiovascular:      Rate and Rhythm: Normal rate.      Heart sounds: Normal heart sounds.   Pulmonary:      Effort: Pulmonary effort is normal. No respiratory distress.      Breath sounds: Normal breath sounds.   Abdominal:      Palpations: Abdomen is soft.      Tenderness: There is no abdominal tenderness.   Musculoskeletal:         General: No deformity.       Cervical back: Neck supple.   Skin:     General: Skin is warm.   Neurological:      Mental Status: He is alert.           ED Course, Procedures, & Data      Procedures       Results for orders placed or performed during the hospital encounter of 12/08/23   CT Abdomen Pelvis w/o Contrast     Status: None    Narrative    CT ABDOMEN AND PELVIS WITHOUT CONTRAST 12/8/2023 3:34 PM    CLINICAL HISTORY: Abdominal pain. Left flank pain.     TECHNIQUE: CT scan of the abdomen and pelvis was performed without IV  contrast. Multiplanar reformats were obtained. Dose reduction  techniques were used.    CONTRAST: None.    COMPARISON: None.    FINDINGS:   LOWER CHEST: Left lower lobe infiltrate.    HEPATOBILIARY: No significant mass or bile duct dilatation. No  calcified gallstones.     PANCREAS: No significant mass, duct dilatation, or inflammatory  change.    SPLEEN: Normal size.    ADRENAL GLANDS: No significant nodules.    KIDNEYS/BLADDER: No significant mass, stones, or hydronephrosis.    BOWEL: No obstruction or inflammatory change. Moderate to large amount  of stool.    VASCULATURE: No abdominal aortic aneurysm.    PELVIC ORGANS: No pelvic masses.    OTHER: No free air or free fluid.    MUSCULOSKELETAL: No frankly destructive bony lesions.      Impression    IMPRESSION:   1.  Consolidation in the left lower lobe compatible with pneumonia.  2.  No renal, ureteral, or bladder stones.    LAST ACEVES MD         SYSTEM ID:  [id number]   XR Chest 2 Views     Status: None    Narrative    CHEST TWO VIEWS 12/8/2023 3:42 PM     HISTORY: Left-sided pain with fever.    COMPARISON: None.       Impression    IMPRESSION: Left lower lobe pneumonia. Right lung clear. The cardiac  silhouette is not enlarged. Pulmonary vasculature is unremarkable.    LAST ACEVES MD         SYSTEM ID:  [id number]   Symptomatic Influenza A/B, RSV, & SARS-CoV2 PCR (COVID-19) Nasopharyngeal     Status: Normal    Specimen: Nasopharyngeal; Swab   Result Value Ref  Range    Influenza A PCR Negative Negative    Influenza B PCR Negative Negative    RSV PCR Negative Negative    SARS CoV2 PCR Negative Negative    Narrative    Testing was performed using the Xpert Xpress CoV2/Flu/RSV Assay on the Cepheid GeneXpert Instrument. This test should be ordered for the detection of SARS-CoV-2, influenza, and RSV viruses in individuals who meet clinical and/or epidemiological criteria. Test performance is unknown in asymptomatic patients. This test is for in vitro diagnostic use under the FDA EUA for laboratories certified under CLIA to perform high or moderate complexity testing. This test has not been FDA cleared or approved. A negative result does not rule out the presence of PCR inhibitors in the specimen or target RNA in concentration below the limit of detection for the assay. If only one viral target is positive but coinfection with multiple targets is suspected, the sample should be re-tested with another FDA cleared, approved, or authorized test, if coinfection would change clinical management. This test was validated by the [facility]. These laboratories are certified under the Clinical Laboratory Improvement Amendments of 1988 (CLIA-88) as qualified to perform high complexity laboratory testing.   Comprehensive metabolic panel     Status: Abnormal   Result Value Ref Range    Sodium 131 (L) 135 - 145 mmol/L    Potassium 4.0 3.4 - 5.3 mmol/L    Carbon Dioxide (CO2) 26 22 - 29 mmol/L    Anion Gap 8 7 - 15 mmol/L    Urea Nitrogen 16.5 6.0 - 20.0 mg/dL    Creatinine 0.80 0.67 - 1.17 mg/dL    GFR Estimate >90 >60 mL/min/1.73m2    Calcium 9.1 8.6 - 10.0 mg/dL    Chloride 97 (L) 98 - 107 mmol/L    Glucose 107 (H) 70 - 99 mg/dL    Alkaline Phosphatase 54 40 - 150 U/L    AST 28 0 - 45 U/L    ALT 41 0 - 70 U/L    Protein Total 7.7 6.4 - 8.3 g/dL    Albumin 4.0 3.5 - 5.2 g/dL    Bilirubin Total 0.5 <=1.2 mg/dL   Lactic acid whole blood     Status: Normal   Result Value  Ref Range    Lactic Acid 1.3 0.7 - 2.0 mmol/L   Procalcitonin     Status: Abnormal   Result Value Ref Range    Procalcitonin 1.43 (H) <0.50 ng/mL   CBC with platelets and differential     Status: Abnormal   Result Value Ref Range    WBC Count 25.6 (H) 4.0 - 11.0 10e3/uL    RBC Count 4.34 (L) 4.40 - 5.90 10e6/uL    Hemoglobin 13.1 (L) 13.3 - 17.7 g/dL    Hematocrit 38.6 (L) 40.0 - 53.0 %    MCV 89 78 - 100 fL    MCH 30.2 26.5 - 33.0 pg    MCHC 33.9 31.5 - 36.5 g/dL    RDW 13.6 10.0 - 15.0 %    Platelet Count 198 150 - 450 10e3/uL    % Neutrophils      % Lymphocytes      % Monocytes      % Eosinophils      % Basophils      % Immature Granulocytes      NRBCs per 100 WBC 0 <1 /100    Absolute Neutrophils      Absolute Lymphocytes      Absolute Monocytes      Absolute Eosinophils      Absolute Basophils      Absolute Immature Granulocytes      Absolute NRBCs 0.0 10e3/uL   Manual Differential     Status: Abnormal   Result Value Ref Range    % Neutrophils 83 %    % Lymphocytes 4 %    % Monocytes 11 %    % Eosinophils 0 %    % Basophils 1 %    % Myelocytes 1 %    Absolute Neutrophils 21.2 (H) 1.6 - 8.3 10e3/uL    Absolute Lymphocytes 1.0 0.8 - 5.3 10e3/uL    Absolute Monocytes 2.8 (H) 0.0 - 1.3 10e3/uL    Absolute Eosinophils 0.0 0.0 - 0.7 10e3/uL    Absolute Basophils 0.3 (H) 0.0 - 0.2 10e3/uL    Absolute Myelocytes 0.3 (H) <=0.0 10e3/uL    RBC Morphology Confirmed RBC Indices     Platelet Assessment  Automated Count Confirmed. Platelet morphology is normal.     Automated Count Confirmed. Platelet morphology is normal.   UA with Microscopic     Status: Abnormal   Result Value Ref Range    Color Urine Light Yellow Colorless, Straw, Light Yellow, Yellow    Appearance Urine Clear Clear    Glucose Urine Negative Negative mg/dL    Bilirubin Urine Negative Negative    Ketones Urine Negative Negative mg/dL    Specific Gravity Urine 1.016 1.003 - 1.035    Blood Urine Negative Negative    pH Urine 8.0 (H) 5.0 - 7.0    Protein  Albumin Urine 10 (A) Negative mg/dL    Urobilinogen Urine Normal Normal, 2.0 mg/dL    Nitrite Urine Negative Negative    Leukocyte Esterase Urine Negative Negative    Mucus Urine Present (A) None Seen /LPF    RBC Urine 0 <=2 /HPF    WBC Urine 2 <=5 /HPF   CRP inflammation     Status: Abnormal   Result Value Ref Range    CRP Inflammation 129.57 (H) <5.00 mg/L   Troponin T, High Sensitivity     Status: Normal   Result Value Ref Range    Troponin T, High Sensitivity <6 <=22 ng/L   Basic metabolic panel     Status: Abnormal   Result Value Ref Range    Sodium 132 (L) 135 - 145 mmol/L    Potassium 3.8 3.4 - 5.3 mmol/L    Chloride 102 98 - 107 mmol/L    Carbon Dioxide (CO2) 24 22 - 29 mmol/L    Anion Gap 6 (L) 7 - 15 mmol/L    Urea Nitrogen 15.5 6.0 - 20.0 mg/dL    Creatinine 0.70 0.67 - 1.17 mg/dL    GFR Estimate >90 >60 mL/min/1.73m2    Calcium 8.7 8.6 - 10.0 mg/dL    Glucose 111 (H) 70 - 99 mg/dL   CBC with platelets     Status: Abnormal   Result Value Ref Range    WBC Count 25.3 (H) 4.0 - 11.0 10e3/uL    RBC Count 4.69 4.40 - 5.90 10e6/uL    Hemoglobin 13.8 13.3 - 17.7 g/dL    Hematocrit 41.0 40.0 - 53.0 %    MCV 87 78 - 100 fL    MCH 29.4 26.5 - 33.0 pg    MCHC 33.7 31.5 - 36.5 g/dL    RDW 13.8 10.0 - 15.0 %    Platelet Count 195 150 - 450 10e3/uL   CRP inflammation     Status: Abnormal   Result Value Ref Range    CRP Inflammation 116.80 (H) <5.00 mg/L   Urine Drug Screen Panel     Status: Abnormal   Result Value Ref Range    Amphetamines Urine Screen Positive (A) Screen Negative    Barbituates Urine Screen Negative Screen Negative    Benzodiazepine Urine Screen Negative Screen Negative    Cannabinoids Urine Screen Negative Screen Negative    Cocaine Urine Screen Negative Screen Negative    Fentanyl Qual Urine Screen Positive (A) Screen Negative    Opiates Urine Screen Negative Screen Negative    PCP Urine Screen Negative Screen Negative   Extra Tube ([place] Draw)     Status: None    Narrative    The following orders  were created for panel order Extra Tube ([place] Draw).  Procedure                               Abnormality         Status                     ---------                               -----------         ------                     Extra Urine Collection[[id number]]                           Final result                 Please view results for these tests on the individual orders.   Extra Urine Collection     Status: None   Result Value Ref Range    Hold Specimen [facility]    EKG 12-lead, complete     Status: None   Result Value Ref Range    Systolic Blood Pressure  mmHg    Diastolic Blood Pressure  mmHg    Ventricular Rate 76 BPM    Atrial Rate 76 BPM    NJ Interval 162 ms    QRS Duration 116 ms     ms    QTc 416 ms    P Axis 63 degrees    R AXIS 56 degrees    T Axis 58 degrees    Interpretation ECG       Sinus rhythm  Normal ECG  Unconfirmed report - interpretation of this ECG is computer generated - see medical record for final interpretation  Confirmed by - EMERGENCY ROOM, PHYSICIAN ([id number]),  GORGE HICKS ([id number]) on 12/9/2023 4:19:28 PM     EKG 12-lead, complete     Status: None (Preliminary result)   Result Value Ref Range    Systolic Blood Pressure  mmHg    Diastolic Blood Pressure  mmHg    Ventricular Rate 55 BPM    Atrial Rate 55 BPM    NJ Interval 162 ms    QRS Duration 112 ms     ms    QTc 401 ms    P Axis 159 degrees    R AXIS 121 degrees    T Axis 137 degrees    Interpretation ECG       ** Suspect arm lead reversal, interpretation assumes no reversal  Unusual P axis, possible ectopic atrial bradycardia  Right axis deviation  Nonspecific ST abnormality  Abnormal ECG  When compared with ECG of 08-DEC-2023 17:20,  Ectopic atrial rhythm has replaced Sinus rhythm  QRS axis Shifted right  T wave inversion now evident in Lateral leads     Urine Culture Aerobic Bacterial     Status: None    Specimen: Urine, Midstream   Result Value Ref Range    Culture No Growth    Blood Culture Peripheral Blood      Status: Normal (Preliminary result)    Specimen: Peripheral Blood   Result Value Ref Range    Culture No growth after 1 day    Blood Culture Peripheral Blood     Status: Abnormal (Preliminary result)    Specimen: Peripheral Blood   Result Value Ref Range    Culture Positive on the 1st day of incubation (A)     Culture Gram positive cocci in pairs and chains (AA)    Respiratory Aerobic Bacterial Culture     Status: None (Preliminary result)    Specimen: Expectorate; Sputum   Result Value Ref Range    Gram Stain Result <10 Squamous epithelial cells/low power field     Gram Stain Result <25 PMNs/low power field     Gram Stain Result 1+ Mixed yamilex    Respiratory Panel PCR     Status: Normal    Specimen: Nasopharyngeal; Swab   Result Value Ref Range    Adenovirus Not Detected Not Detected    Coronavirus Not Detected Not Detected    Human Metapneumovirus Not Detected Not Detected    Human Rhin/Enterovirus Not Detected Not Detected    Influenza A Not Detected Not Detected    Influenza A, H1 Not Detected Not Detected    Influenza A 2009 H1N1 Not Detected Not Detected    Influenza A, H3 Not Detected Not Detected    Influenza B Not Detected Not Detected    Parainfluenza Virus 1 Not Detected Not Detected    Parainfluenza Virus 2 Not Detected Not Detected    Parainfluenza Virus 3 Not Detected Not Detected    Parainfluenza Virus 4 Not Detected Not Detected    Respiratory Syncytial Virus A Not Detected Not Detected    Respiratory Syncytial Virus B Not Detected Not Detected    Chlamydia Pneumoniae Not Detected Not Detected    Mycoplasma Pneumoniae Not Detected Not Detected    Narrative    The ePlex Respiratory Panel is a qualitative nucleic acid, multiplex, in vitro diagnostic test for the simultaneous detection and identification of multiple respiratory viral and bacterial nucleic acids in nasopharyngeal swabs collected in viral transport media from individual exhibiting signs and symptoms of respiratory infection. The assay has  received FDA approval for the testing of nasopharyngeal (NP) swabs only. The [facility] at [facility] has validated the performance characteristics for bronchial alveolar lavage specimens. This test is used for clinical purposes and should not be regarded as investigational or for research. This laboratory is certified under the Clinical Laboratory Improvement Amendments of 1988 (CLIA-88) as qualified to perform high complexity clinical laboratory testing.    Verigene GP Panel     Status: Abnormal    Specimen: Peripheral Blood   Result Value Ref Range    Staphylococcus species Not Detected Not Detected    Staphylococcus aureus Not Detected Not Detected    Staphylococcus epidermidis Not Detected Not Detected    Staphylococcus lugdunensis Not Detected Not Detected    Enterococcus faecalis Not Detected Not Detected    Enterococcus faecium Not Detected Not Detected    Streptococcus agalactiae Not Detected Not Detected    Streptococcus anginosus group Not Detected Not Detected    Streptococcus pneumoniae Detected (A) Not Detected    Streptococcus pyogenes Not Detected Not Detected    Listeria species Not Detected Not Detected    Narrative    Specimen tested with Verigene multiplex, gram-positive blood culture nucleic acid test for the following targets: Staphylococcus aureus, Staphylococcus epidermidis, Staphylococcus lugdunensis, other Staphylococcus species, Enterococcus faecalis, Enterococcus faecium, Streptococcus species, Streptococcus agalactiae, Streptococcus anginosus group, Streptococcus pneumoniae, Streptococcus pyogenes, Listeria species, mecA (methicillin resistance), and Jorge/vanB (vancomycin resistance).   MRSA MSSA PCR, Nasal Swab     Status: Abnormal    Specimen: Nose; Swab   Result Value Ref Range    MRSA Target DNA Positive (A) Negative    SA Target DNA Positive     Narrative    The [employer]  Xpert SA Nasal Complete assay performed in the [employer]  Dx System is a  qualitative in vitro diagnostic test designed for rapid detection of Staphylococcus aureus (SA) and methicillin-resistant Staphylococcus aureus (MRSA) from nasal swabs in patients at risk for nasal colonization. The test utilizes automated real-time polymerase chain reaction (PCR) to detect MRSA/SA DNA. The Xpert SA Nasal Complete assay is intended to aid in the prevention and control of MRSA/SA infections in healthcare settings. The assay is not intended to diagnose, guide or monitor treatment for MRSA/SA infections, or provide results of susceptibility to methicillin. A negative result does not preclude MRSA/SA nasal colonization.    Blood Culture Arm, Right     Status: Normal (Preliminary result)    Specimen: Arm, Right; Blood   Result Value Ref Range    Culture No growth after 12 hours    Blood Culture Arm, Right     Status: Normal (Preliminary result)    Specimen: Arm, Right; Blood   Result Value Ref Range    Culture No growth after 12 hours    CBC with platelets differential     Status: Abnormal    Narrative    The following orders were created for panel order CBC with platelets differential.  Procedure                               Abnormality         Status                     ---------                               -----------         ------                     CBC with platelets and d...[[id number]]  Abnormal            Final result               Manual Differential[[id number]]          Abnormal            Final result                 Please view results for these tests on the individual orders.   Urine Drug Screen     Status: Abnormal    Narrative    The following orders were created for panel order Urine Drug Screen.  Procedure                               Abnormality         Status                     ---------                               -----------         ------                     Urine Drug Screen Panel[[id number]]      Abnormal            Final result                 Please view results for these  tests on the individual orders.     Medications   pharmacy alert - intermittent dosing (has no administration in time range)   lidocaine 1 % 0.1-1 mL (has no administration in time range)   lidocaine (LMX4) cream (has no administration in time range)   sodium chloride (PF) 0.9% PF flush 3 mL (3 mLs Intracatheter Not Given 12/10/23 0844)   sodium chloride (PF) 0.9% PF flush 3 mL (has no administration in time range)   senna-docusate (SENOKOT-S/PERICOLACE) 8.6-50 MG per tablet 1 tablet (has no administration in time range)     Or   senna-docusate (SENOKOT-S/PERICOLACE) 8.6-50 MG per tablet 2 tablet (has no administration in time range)   calcium carbonate (TUMS) chewable tablet 1,000 mg (has no administration in time range)   acetaminophen (TYLENOL) tablet 650 mg (650 mg Oral $Given 12/9/23 1429)     Or   acetaminophen (TYLENOL) Suppository 650 mg ( Rectal See Alternative 12/9/23 1429)   ibuprofen (ADVIL/MOTRIN) tablet 600 mg (600 mg Oral $Given 12/9/23 0441)   ondansetron (ZOFRAN ODT) ODT tab 4 mg ( Oral See Alternative 12/9/23 2358)     Or   ondansetron (ZOFRAN) injection 4 mg (4 mg Intravenous $Given 12/9/23 2358)   artificial saliva (BIOTENE MT) solution 1 spray (1 spray Mouth/Throat $Given 12/10/23 0843)   guaiFENesin (ROBITUSSIN) 20 mg/mL solution 200 mg (has no administration in time range)   melatonin tablet 3 mg (3 mg Oral $Given 12/9/23 2226)   methadone (DOLOPHINE) solution 30 mg (30 mg Oral $Given 12/9/23 2226)     Followed by   methadone (DOLOPHINE) solution 20 mg (has no administration in time range)     Followed by   methadone (DOLOPHINE) solution 10 mg (has no administration in time range)   ondansetron (ZOFRAN ODT) ODT tab 4 mg (has no administration in time range)   loperamide (IMODIUM) capsule 2 mg (has no administration in time range)   cloNIDine (CATAPRES) tablet 0.1 mg (0.1 mg Oral $Given 12/9/23 8145)   dicyclomine (BENTYL) capsule 20 mg (has no administration in time range)   methocarbamol  (ROBAXIN) tablet 500 mg (500 mg Oral $Given 12/9/23 2346)   hydrOXYzine HCl (ATARAX) tablet 25 mg (25 mg Oral $Given 12/9/23 2346)   amoxicillin-clavulanate (AUGMENTIN) 875-125 MG per tablet 1 tablet (has no administration in time range)   acetaminophen (TYLENOL) tablet 1,000 mg (1,000 mg Oral $Given 12/8/23 1428)   sodium chloride 0.9% BOLUS 1,000 mL (0 mLs Intravenous Stopped 12/8/23 1530)   ketorolac (TORADOL) injection 30 mg (30 mg Intravenous $Given 12/8/23 1445)   cefTRIAXone (ROCEPHIN) 2 g vial to attach to  ml bag for ADULTS or NS 50 ml bag for PEDS (0 g Intravenous Stopped 12/8/23 1900)   azithromycin 500 mg (ZITHROMAX) in 0.9% NaCl 250 mL intermittent infusion 500 mg (0 mg Intravenous Stopped 12/8/23 1859)   oxyCODONE (ROXICODONE) tablet 5 mg (5 mg Oral $Given 12/9/23 0511)   azithromycin (ZITHROMAX) tablet 500 mg (500 mg Oral $Given 12/10/23 0842)     Labs Ordered and Resulted from Time of ED Arrival to Time of ED Departure   COMPREHENSIVE METABOLIC PANEL - Abnormal       Result Value    Sodium 131 (*)     Potassium 4.0      Carbon Dioxide (CO2) 26      Anion Gap 8      Urea Nitrogen 16.5      Creatinine 0.80      GFR Estimate >90      Calcium 9.1      Chloride 97 (*)     Glucose 107 (*)     Alkaline Phosphatase 54      AST 28      ALT 41      Protein Total 7.7      Albumin 4.0      Bilirubin Total 0.5     PROCALCITONIN - Abnormal    Procalcitonin 1.43 (*)    CBC WITH PLATELETS AND DIFFERENTIAL - Abnormal    WBC Count 25.6 (*)     RBC Count 4.34 (*)     Hemoglobin 13.1 (*)     Hematocrit 38.6 (*)     MCV 89      MCH 30.2      MCHC 33.9      RDW 13.6      Platelet Count 198      % Neutrophils        % Lymphocytes        % Monocytes        % Eosinophils        % Basophils        % Immature Granulocytes        NRBCs per 100 WBC 0      Absolute Neutrophils        Absolute Lymphocytes        Absolute Monocytes        Absolute Eosinophils        Absolute Basophils        Absolute Immature Granulocytes         Absolute NRBCs 0.0     DIFFERENTIAL - Abnormal    % Neutrophils 83      % Lymphocytes 4      % Monocytes 11      % Eosinophils 0      % Basophils 1      % Myelocytes 1      Absolute Neutrophils 21.2 (*)     Absolute Lymphocytes 1.0      Absolute Monocytes 2.8 (*)     Absolute Eosinophils 0.0      Absolute Basophils 0.3 (*)     Absolute Myelocytes 0.3 (*)     RBC Morphology Confirmed RBC Indices      Platelet Assessment        Value: Automated Count Confirmed. Platelet morphology is normal.   ROUTINE UA WITH MICROSCOPIC - Abnormal    Color Urine Light Yellow      Appearance Urine Clear      Glucose Urine Negative      Bilirubin Urine Negative      Ketones Urine Negative      Specific Gravity Urine 1.016      Blood Urine Negative      pH Urine 8.0 (*)     Protein Albumin Urine 10 (*)     Urobilinogen Urine Normal      Nitrite Urine Negative      Leukocyte Esterase Urine Negative      Mucus Urine Present (*)     RBC Urine 0      WBC Urine 2     CRP INFLAMMATION - Abnormal    CRP Inflammation 129.57 (*)    INFLUENZA A/B, RSV, & SARS-COV2 PCR - Normal    Influenza A PCR Negative      Influenza B PCR Negative      RSV PCR Negative      SARS CoV2 PCR Negative     LACTIC ACID WHOLE BLOOD - Normal    Lactic Acid 1.3     GRAM STAIN     XR Chest 2 Views   Final Result   IMPRESSION: Left lower lobe pneumonia. Right lung clear. The cardiac   silhouette is not enlarged. Pulmonary vasculature is unremarkable.      LAST ACEVES MD            SYSTEM ID:  [id number]      CT Abdomen Pelvis w/o Contrast   Final Result   IMPRESSION:    1.  Consolidation in the left lower lobe compatible with pneumonia.   2.  No renal, ureteral, or bladder stones.      LAST ACEVES MD            SYSTEM ID:  [id number]             Critical care was not performed.     Medical Decision Making  The patient's presentation was of high complexity (an acute health issue posing potential threat to life or bodily function).    The patient's evaluation  involved:  ordering and/or review of 3+ test(s) in this encounter (see separate area of note for details)    The patient's management necessitated high risk (a decision regarding hospitalization).    Assessment & Plan        I have reviewed the nursing notes. I have reviewed the findings, diagnosis, plan and need for follow up with the patient.    Current Discharge Medication List        START taking these medications    Details   amoxicillin (AMOXIL) 500 MG tablet Take 2 tablets (1,000 mg) by mouth 3 times daily for 4 days  Qty: 24 tablet, Refills: 0    Associated Diagnoses: Pneumonia of left lower lobe due to infectious organism      azithromycin (ZITHROMAX) 250 MG tablet Take 1 tablet (250 mg) by mouth daily  Qty: 4 tablet, Refills: 0    Associated Diagnoses: Pneumonia of left lower lobe due to infectious organism      guaiFENesin (ROBITUSSIN) 20 mg/mL liquid Take 10 mLs (200 mg) by mouth every 4 hours as needed for other (secretion expectorant)  Qty: 180 mL, Refills: 0    Associated Diagnoses: Pneumonia of left lower lobe due to infectious organism             Final diagnoses:   Pneumonia of left lower lobe due to infectious organism       Steven Zarate MD  [facility] EMERGENCY DEPARTMENT  12/8/2023     Steven Zarate MD  12/10/23 0928
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
## 2023-12-08 NOTE — MEDICATION SCRIBE - ADMISSION MEDICATION HISTORY
Medication Scribe Admission Medication History    Admission medication history is complete. The information provided in this note is only as accurate as the sources available at the time of the update.    Information Source(s): Patient and CareEverywhere/SureScripts via in-person    Pertinent Information: Pt reports no medications or OTC and supplements.     Changes made to PTA medication list:  Added: None  Deleted: acetaminophen, flexeril, lidocaine, oxycodone, senna  Changed: None    Medication Affordability:       Allergies reviewed with patient and updates made in EHR: yes    Medication History Completed By: Suzanne Doran 12/8/2023 4:53 PM    No outpatient medications have been marked as taking for the 12/8/23 encounter (Hospital Encounter).

## 2023-12-08 NOTE — ED NOTES
Bed: [facility]-  Expected date: 12/8/23  Expected time: 1:47 PM  Means of arrival:   Comments:  Hen 417: 29 y/o, M, Fever

## 2023-12-09 PROBLEM — F41.9 ANXIETY: Status: ACTIVE | Noted: 2023-12-09

## 2023-12-09 LAB
AMPHETAMINES UR QL SCN: ABNORMAL
ANION GAP SERPL CALCULATED.3IONS-SCNC: 6 MMOL/L (ref 7–15)
ATRIAL RATE - MUSE: 76 BPM
BACTERIA UR CULT: NO GROWTH
BARBITURATES UR QL SCN: ABNORMAL
BENZODIAZ UR QL SCN: ABNORMAL
BUN SERPL-MCNC: 15.5 MG/DL (ref 6–20)
BZE UR QL SCN: ABNORMAL
C PNEUM DNA SPEC QL NAA+PROBE: NOT DETECTED
CALCIUM SERPL-MCNC: 8.7 MG/DL (ref 8.6–10)
CANNABINOIDS UR QL SCN: ABNORMAL
CHLORIDE SERPL-SCNC: 102 MMOL/L (ref 98–107)
CREAT SERPL-MCNC: 0.7 MG/DL (ref 0.67–1.17)
CRP SERPL-MCNC: 116.8 MG/L
DEPRECATED HCO3 PLAS-SCNC: 24 MMOL/L (ref 22–29)
DIASTOLIC BLOOD PRESSURE - MUSE: NORMAL MMHG
EGFRCR SERPLBLD CKD-EPI 2021: >90 ML/MIN/1.73M2
ENTEROCOCCUS FAECALIS: NOT DETECTED
ENTEROCOCCUS FAECIUM: NOT DETECTED
ERYTHROCYTE [DISTWIDTH] IN BLOOD BY AUTOMATED COUNT: 13.8 % (ref 10–15)
FENTANYL UR QL: ABNORMAL
FLUAV H1 2009 PAND RNA SPEC QL NAA+PROBE: NOT DETECTED
FLUAV H1 RNA SPEC QL NAA+PROBE: NOT DETECTED
FLUAV H3 RNA SPEC QL NAA+PROBE: NOT DETECTED
FLUAV RNA SPEC QL NAA+PROBE: NOT DETECTED
FLUBV RNA SPEC QL NAA+PROBE: NOT DETECTED
GLUCOSE SERPL-MCNC: 111 MG/DL (ref 70–99)
HADV DNA SPEC QL NAA+PROBE: NOT DETECTED
HCOV PNL SPEC NAA+PROBE: NOT DETECTED
HCT VFR BLD AUTO: 41 % (ref 40–53)
HGB BLD-MCNC: 13.8 G/DL (ref 13.3–17.7)
HMPV RNA SPEC QL NAA+PROBE: NOT DETECTED
HPIV1 RNA SPEC QL NAA+PROBE: NOT DETECTED
HPIV2 RNA SPEC QL NAA+PROBE: NOT DETECTED
HPIV3 RNA SPEC QL NAA+PROBE: NOT DETECTED
HPIV4 RNA SPEC QL NAA+PROBE: NOT DETECTED
INTERPRETATION ECG - MUSE: NORMAL
LISTERIA SPECIES (DETECTED/NOT DETECTED): NOT DETECTED
M PNEUMO DNA SPEC QL NAA+PROBE: NOT DETECTED
MCH RBC QN AUTO: 29.4 PG (ref 26.5–33)
MCHC RBC AUTO-ENTMCNC: 33.7 G/DL (ref 31.5–36.5)
MCV RBC AUTO: 87 FL (ref 78–100)
MRSA DNA SPEC QL NAA+PROBE: POSITIVE
OPIATES UR QL SCN: ABNORMAL
P AXIS - MUSE: 63 DEGREES
PCP QUAL URINE (ROCHE): ABNORMAL
PLATELET # BLD AUTO: 195 10E3/UL (ref 150–450)
POTASSIUM SERPL-SCNC: 3.8 MMOL/L (ref 3.4–5.3)
PR INTERVAL - MUSE: 162 MS
QRS DURATION - MUSE: 116 MS
QT - MUSE: 370 MS
QTC - MUSE: 416 MS
R AXIS - MUSE: 56 DEGREES
RBC # BLD AUTO: 4.69 10E6/UL (ref 4.4–5.9)
RSV RNA SPEC QL NAA+PROBE: NOT DETECTED
RSV RNA SPEC QL NAA+PROBE: NOT DETECTED
RV+EV RNA SPEC QL NAA+PROBE: NOT DETECTED
SA TARGET DNA: POSITIVE
SODIUM SERPL-SCNC: 132 MMOL/L (ref 135–145)
STAPHYLOCOCCUS AUREUS: NOT DETECTED
STAPHYLOCOCCUS EPIDERMIDIS: NOT DETECTED
STAPHYLOCOCCUS LUGDUNENSIS: NOT DETECTED
STAPHYLOCOCCUS SPECIES: NOT DETECTED
STREPTOCOCCUS AGALACTIAE: NOT DETECTED
STREPTOCOCCUS ANGINOSUS GROUP: NOT DETECTED
STREPTOCOCCUS PNEUMONIAE: DETECTED
STREPTOCOCCUS PYOGENES: NOT DETECTED
SYSTOLIC BLOOD PRESSURE - MUSE: NORMAL MMHG
T AXIS - MUSE: 58 DEGREES
TROPONIN T SERPL HS-MCNC: <6 NG/L
VENTRICULAR RATE- MUSE: 76 BPM
WBC # BLD AUTO: 25.3 10E3/UL (ref 4–11)

## 2023-12-09 PROCEDURE — 36415 COLL VENOUS BLD VENIPUNCTURE: CPT | Performed by: STUDENT IN AN ORGANIZED HEALTH CARE EDUCATION/TRAINING PROGRAM

## 2023-12-09 PROCEDURE — 258N000003 HC RX IP 258 OP 636: Performed by: FAMILY MEDICINE

## 2023-12-09 PROCEDURE — 250N000011 HC RX IP 250 OP 636: Mod: JZ | Performed by: STUDENT IN AN ORGANIZED HEALTH CARE EDUCATION/TRAINING PROGRAM

## 2023-12-09 PROCEDURE — 87581 M.PNEUMON DNA AMP PROBE: CPT | Performed by: STUDENT IN AN ORGANIZED HEALTH CARE EDUCATION/TRAINING PROGRAM

## 2023-12-09 PROCEDURE — 250N000013 HC RX MED GY IP 250 OP 250 PS 637: Performed by: FAMILY MEDICINE

## 2023-12-09 PROCEDURE — 87640 STAPH A DNA AMP PROBE: CPT

## 2023-12-09 PROCEDURE — G0378 HOSPITAL OBSERVATION PER HR: HCPCS

## 2023-12-09 PROCEDURE — 80048 BASIC METABOLIC PNL TOTAL CA: CPT | Performed by: STUDENT IN AN ORGANIZED HEALTH CARE EDUCATION/TRAINING PROGRAM

## 2023-12-09 PROCEDURE — 85027 COMPLETE CBC AUTOMATED: CPT | Performed by: STUDENT IN AN ORGANIZED HEALTH CARE EDUCATION/TRAINING PROGRAM

## 2023-12-09 PROCEDURE — 87040 BLOOD CULTURE FOR BACTERIA: CPT | Performed by: STUDENT IN AN ORGANIZED HEALTH CARE EDUCATION/TRAINING PROGRAM

## 2023-12-09 PROCEDURE — 87205 SMEAR GRAM STAIN: CPT | Performed by: STUDENT IN AN ORGANIZED HEALTH CARE EDUCATION/TRAINING PROGRAM

## 2023-12-09 PROCEDURE — 250N000013 HC RX MED GY IP 250 OP 250 PS 637: Performed by: STUDENT IN AN ORGANIZED HEALTH CARE EDUCATION/TRAINING PROGRAM

## 2023-12-09 PROCEDURE — 120N000002 HC R&B MED SURG/OB UMMC

## 2023-12-09 PROCEDURE — 250N000013 HC RX MED GY IP 250 OP 250 PS 637

## 2023-12-09 PROCEDURE — 87633 RESP VIRUS 12-25 TARGETS: CPT | Performed by: STUDENT IN AN ORGANIZED HEALTH CARE EDUCATION/TRAINING PROGRAM

## 2023-12-09 PROCEDURE — 250N000011 HC RX IP 250 OP 636: Performed by: FAMILY MEDICINE

## 2023-12-09 PROCEDURE — 99232 SBSQ HOSP IP/OBS MODERATE 35: CPT | Mod: GC | Performed by: STUDENT IN AN ORGANIZED HEALTH CARE EDUCATION/TRAINING PROGRAM

## 2023-12-09 PROCEDURE — 93010 ELECTROCARDIOGRAM REPORT: CPT | Performed by: INTERNAL MEDICINE

## 2023-12-09 RX ORDER — HYDROXYZINE HYDROCHLORIDE 25 MG/1
25 TABLET, FILM COATED ORAL 4 TIMES DAILY PRN
Status: DISCONTINUED | OUTPATIENT
Start: 2023-12-09 | End: 2023-12-10 | Stop reason: HOSPADM

## 2023-12-09 RX ORDER — CLONIDINE HYDROCHLORIDE 0.1 MG/1
0.1 TABLET ORAL EVERY 6 HOURS PRN
Status: DISCONTINUED | OUTPATIENT
Start: 2023-12-09 | End: 2023-12-10 | Stop reason: HOSPADM

## 2023-12-09 RX ORDER — AZITHROMYCIN 500 MG/1
500 TABLET, FILM COATED ORAL DAILY
Qty: 2 TABLET | Refills: 0 | Status: COMPLETED | OUTPATIENT
Start: 2023-12-09 | End: 2023-12-10

## 2023-12-09 RX ORDER — AZITHROMYCIN 500 MG/5ML
500 INJECTION, POWDER, LYOPHILIZED, FOR SOLUTION INTRAVENOUS EVERY 24 HOURS
Status: DISCONTINUED | OUTPATIENT
Start: 2023-12-09 | End: 2023-12-09

## 2023-12-09 RX ORDER — DICYCLOMINE HYDROCHLORIDE 10 MG/1
20 CAPSULE ORAL 3 TIMES DAILY PRN
Status: DISCONTINUED | OUTPATIENT
Start: 2023-12-09 | End: 2023-12-10 | Stop reason: HOSPADM

## 2023-12-09 RX ORDER — METHADONE HYDROCHLORIDE 5 MG/5ML
30 SOLUTION ORAL ONCE
Qty: 30 ML | Refills: 0 | Status: COMPLETED | OUTPATIENT
Start: 2023-12-09 | End: 2023-12-09

## 2023-12-09 RX ORDER — AZITHROMYCIN 250 MG/1
250 TABLET, FILM COATED ORAL DAILY
Qty: 4 TABLET | Refills: 0 | Status: SHIPPED | OUTPATIENT
Start: 2023-12-09 | End: 2023-12-10

## 2023-12-09 RX ORDER — METHADONE HYDROCHLORIDE 5 MG/5ML
10 SOLUTION ORAL ONCE
Qty: 10 ML | Refills: 0 | Status: DISCONTINUED | OUTPATIENT
Start: 2023-12-11 | End: 2023-12-10

## 2023-12-09 RX ORDER — AMOXICILLIN 500 MG/1
1000 TABLET, FILM COATED ORAL 3 TIMES DAILY
Qty: 24 TABLET | Refills: 0 | Status: SHIPPED | OUTPATIENT
Start: 2023-12-09 | End: 2023-12-10

## 2023-12-09 RX ORDER — ONDANSETRON 4 MG/1
4 TABLET, ORALLY DISINTEGRATING ORAL EVERY 6 HOURS PRN
Status: DISCONTINUED | OUTPATIENT
Start: 2023-12-09 | End: 2023-12-10 | Stop reason: HOSPADM

## 2023-12-09 RX ORDER — LANOLIN ALCOHOL/MO/W.PET/CERES
3 CREAM (GRAM) TOPICAL
Status: DISCONTINUED | OUTPATIENT
Start: 2023-12-09 | End: 2023-12-10 | Stop reason: HOSPADM

## 2023-12-09 RX ORDER — LOPERAMIDE HCL 2 MG
2 CAPSULE ORAL EVERY 4 HOURS PRN
Status: DISCONTINUED | OUTPATIENT
Start: 2023-12-09 | End: 2023-12-10 | Stop reason: HOSPADM

## 2023-12-09 RX ORDER — GUAIFENESIN 200 MG/10ML
200 LIQUID ORAL EVERY 4 HOURS PRN
Qty: 180 ML | Refills: 0 | Status: SHIPPED | OUTPATIENT
Start: 2023-12-09

## 2023-12-09 RX ORDER — METHOCARBAMOL 500 MG/1
500 TABLET, FILM COATED ORAL 3 TIMES DAILY PRN
Status: DISCONTINUED | OUTPATIENT
Start: 2023-12-09 | End: 2023-12-10 | Stop reason: HOSPADM

## 2023-12-09 RX ORDER — OXYCODONE HYDROCHLORIDE 5 MG/1
5 TABLET ORAL ONCE
Status: COMPLETED | OUTPATIENT
Start: 2023-12-09 | End: 2023-12-09

## 2023-12-09 RX ORDER — METHADONE HYDROCHLORIDE 5 MG/5ML
20 SOLUTION ORAL ONCE
Qty: 20 ML | Refills: 0 | Status: DISCONTINUED | OUTPATIENT
Start: 2023-12-10 | End: 2023-12-10

## 2023-12-09 RX ADMIN — OXYCODONE HYDROCHLORIDE 5 MG: 5 TABLET ORAL at 05:11

## 2023-12-09 RX ADMIN — ACETAMINOPHEN 650 MG: 325 TABLET, FILM COATED ORAL at 01:24

## 2023-12-09 RX ADMIN — HYDROXYZINE HYDROCHLORIDE 50 MG: 25 TABLET, FILM COATED ORAL at 04:41

## 2023-12-09 RX ADMIN — ONDANSETRON 4 MG: 2 INJECTION INTRAMUSCULAR; INTRAVENOUS at 23:58

## 2023-12-09 RX ADMIN — HYDROXYZINE HYDROCHLORIDE 25 MG: 25 TABLET, FILM COATED ORAL at 23:46

## 2023-12-09 RX ADMIN — ACETAMINOPHEN 650 MG: 325 TABLET, FILM COATED ORAL at 14:29

## 2023-12-09 RX ADMIN — HYDROXYZINE HYDROCHLORIDE 25 MG: 25 TABLET, FILM COATED ORAL at 02:09

## 2023-12-09 RX ADMIN — Medication 1 SPRAY: at 19:50

## 2023-12-09 RX ADMIN — VANCOMYCIN HYDROCHLORIDE 1250 MG: 10 INJECTION, POWDER, LYOPHILIZED, FOR SOLUTION INTRAVENOUS at 07:12

## 2023-12-09 RX ADMIN — IBUPROFEN 600 MG: 600 TABLET, FILM COATED ORAL at 04:41

## 2023-12-09 RX ADMIN — AZITHROMYCIN DIHYDRATE 500 MG: 500 TABLET ORAL at 11:21

## 2023-12-09 RX ADMIN — Medication 3 MG: at 22:26

## 2023-12-09 RX ADMIN — Medication 1 SPRAY: at 09:04

## 2023-12-09 RX ADMIN — METHOCARBAMOL 500 MG: 500 TABLET ORAL at 23:46

## 2023-12-09 RX ADMIN — METHADONE HYDROCHLORIDE 30 MG: 5 SOLUTION ORAL at 22:26

## 2023-12-09 RX ADMIN — Medication 1 SPRAY: at 11:23

## 2023-12-09 RX ADMIN — CLONIDINE HYDROCHLORIDE 0.1 MG: 0.1 TABLET ORAL at 23:46

## 2023-12-09 RX ADMIN — VANCOMYCIN HYDROCHLORIDE 1250 MG: 10 INJECTION, POWDER, LYOPHILIZED, FOR SOLUTION INTRAVENOUS at 18:53

## 2023-12-09 ASSESSMENT — ACTIVITIES OF DAILY LIVING (ADL)
ADLS_ACUITY_SCORE: 18
ADLS_ACUITY_SCORE: 35
ADLS_ACUITY_SCORE: 18
ADLS_ACUITY_SCORE: 35
ADLS_ACUITY_SCORE: 18

## 2023-12-09 NOTE — PHARMACY-VANCOMYCIN DOSING SERVICE
Pharmacy Vancomycin Initial Note  Date of Service 2023  Patient's  1995  28 year old, male    Indication: Bacteremia, CAP and bacteremia. hx respiratory mrsa summer 2023.    Current estimated CrCl = CrCl cannot be calculated (Unknown ideal weight.).    Creatinine for last 3 days  2023:  2:42 PM Creatinine 0.80 mg/dL    Recent Vancomycin Level(s) for last 3 days  No results found for requested labs within last 3 days.      Vancomycin IV Administrations (past 72 hours)        No vancomycin orders with administrations in past 72 hours.                    Nephrotoxins and other renal medications (From now, onward)      Start     Dose/Rate Route Frequency Ordered Stop    23 0500  vancomycin (VANCOCIN) 1,250 mg in 0.9% NaCl 250 mL intermittent infusion         1,250 mg  over 90 Minutes Intravenous EVERY 12 HOURS 23 0445      23 1658  ibuprofen (ADVIL/MOTRIN) tablet 600 mg         600 mg Oral EVERY 6 HOURS PRN 23 1700              Contrast Orders - past 72 hours (72h ago, onward)      None            Insight[employer] Prediction of Planned Initial Vancomycin Regimen    Loading dose: N/A  Regimen: 1250 mg IV every 12 hours.  Start time: 04:43 on 2023  Exposure target: AUC24 (range)400-600 mg/L.hr   AUC24,ss: 500 mg/L.hr  Probability of AUC24 > 400: 73 %  Ctrough,ss: 14.2 mg/L  Probability of Ctrough,ss > 20: 25 %  Probability of nephrotoxicity (Lodise SUAD ): 9 %          Plan:  Start vancomycin  1250 mg IV q12h.   Vancomycin monitoring method: AUC  Vancomycin therapeutic monitoring goal: 400-600 mg*h/L  Pharmacy will check vancomycin levels as appropriate in 1-3 Days.    Serum creatinine levels will be ordered daily for the first week of therapy and at least twice weekly for subsequent weeks.      Rafael Taveras RP
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
## 2023-12-09 NOTE — PLAN OF CARE
Goal Outcome Evaluation:    VS: /54 (BP Location: Right arm)   Pulse 78   Temp 98.2  F (36.8  C) (Oral)   Resp 17   SpO2 94%      O2: Stable on room air >90%   Output: Voids without difficulties   Last BM: 12/9/23   Activity: Independent, sleepy for the most part   Up for meals? Yes, at 50% of meal   Skin: Intact   Pain: Reports left chest pain, rates  at 8/10, Provider paged and assessment was done   CMS: AO x4   Dressing: None   Diet: Regular   LDA: Rt PIV infusing vanco   Equipment: IV pole, call light, personal belongings   Plan: Monitor chest pain, EKG ordered.    Additional Info:

## 2023-12-09 NOTE — PLAN OF CARE
Problem: Pain Acute  Goal: Optimal Pain Control and Function  Outcome: Progressing  Intervention: Develop Pain Management Plan  Recent Flowsheet Documentation  Taken 12/9/2023 0400 by Gifty Anderson RN  Pain Management Interventions: breathing exercises   Goal Outcome Evaluation:    Upper chest and back pain on the left side reported by the patient rated a 10/10. Medications administered and warm packs given to help control the pain. The pt. Reported a slight improvement after OXY was administered.
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
## 2023-12-09 NOTE — DISCHARGE INSTRUCTIONS
It is important that you take your antibiotics even if you are feeling better.     Augmentin 875-125 mg. Take twice per day through 12/14. Take one dose tonight (12/10 pm) since you already had your morning dose.    Follow up with your PCP if you're not feeling better after you're done with the antibiotics.
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
## 2023-12-10 VITALS
BODY MASS INDEX: 21.69 KG/M2 | DIASTOLIC BLOOD PRESSURE: 75 MMHG | SYSTOLIC BLOOD PRESSURE: 91 MMHG | RESPIRATION RATE: 16 BRPM | TEMPERATURE: 98.5 F | OXYGEN SATURATION: 90 % | HEART RATE: 74 BPM | WEIGHT: 151.2 LBS

## 2023-12-10 LAB
HOLD SPECIMEN: NORMAL
TROPONIN T SERPL HS-MCNC: 7 NG/L

## 2023-12-10 PROCEDURE — 36415 COLL VENOUS BLD VENIPUNCTURE: CPT | Performed by: STUDENT IN AN ORGANIZED HEALTH CARE EDUCATION/TRAINING PROGRAM

## 2023-12-10 PROCEDURE — 87040 BLOOD CULTURE FOR BACTERIA: CPT | Performed by: STUDENT IN AN ORGANIZED HEALTH CARE EDUCATION/TRAINING PROGRAM

## 2023-12-10 PROCEDURE — 250N000011 HC RX IP 250 OP 636: Performed by: FAMILY MEDICINE

## 2023-12-10 PROCEDURE — 258N000003 HC RX IP 258 OP 636: Performed by: FAMILY MEDICINE

## 2023-12-10 PROCEDURE — 250N000013 HC RX MED GY IP 250 OP 250 PS 637: Performed by: FAMILY MEDICINE

## 2023-12-10 PROCEDURE — 250N000013 HC RX MED GY IP 250 OP 250 PS 637

## 2023-12-10 PROCEDURE — 99239 HOSP IP/OBS DSCHRG MGMT >30: CPT | Mod: GC

## 2023-12-10 PROCEDURE — 84484 ASSAY OF TROPONIN QUANT: CPT

## 2023-12-10 RX ORDER — METHADONE HYDROCHLORIDE 10 MG/ML
30 CONCENTRATE ORAL DAILY
Status: DISCONTINUED | OUTPATIENT
Start: 2023-12-10 | End: 2023-12-10 | Stop reason: HOSPADM

## 2023-12-10 RX ADMIN — Medication 1 SPRAY: at 12:45

## 2023-12-10 RX ADMIN — AZITHROMYCIN DIHYDRATE 500 MG: 500 TABLET ORAL at 08:42

## 2023-12-10 RX ADMIN — Medication 1 SPRAY: at 08:43

## 2023-12-10 RX ADMIN — AMOXICILLIN AND CLAVULANATE POTASSIUM 1 TABLET: 875; 125 TABLET, FILM COATED ORAL at 09:37

## 2023-12-10 RX ADMIN — VANCOMYCIN HYDROCHLORIDE 1250 MG: 10 INJECTION, POWDER, LYOPHILIZED, FOR SOLUTION INTRAVENOUS at 06:27

## 2023-12-10 ASSESSMENT — ACTIVITIES OF DAILY LIVING (ADL)
ADLS_ACUITY_SCORE: 18
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
## 2023-12-10 NOTE — PLAN OF CARE
[id number]    Patient is A&O x4. Call light within reach. Able to make needs known effectively. Independent in the room. Voids spontaneously to the bathroom. LBM: 12/09.    RA. Regular diet. PIV on R lower forearm, SL, intact and patent. Denies pain, SOB, chest pain and n/t. Complaints of nausea managed with PRN Zofran.     Patient spoke to this writer and informed that he is withdrawing from opioid use. Patient reported that he took 50mg of percocet prior admission. Denies Fentanyl and Amphetamine use. Patient noted to be agitated, nauseous and anxious. VSS stable. Provider notified. Methadone given. PRN clonidine, PRN Robaxin and PRN Atarax given. Nausea managed with PRN Zofran.     Refused lab draws. Contact precaution maintained. Continue with POC.

## 2023-12-10 NOTE — PROGRESS NOTES
[facility]   BRIEF PROGRESS NOTE    SUBJECTIVE  Went to see patient due to nursing report of withdrawal symptoms, and as he reported home percocet use. Upon arrival to the room, patient was lying in bed, anxious appearing, sweaty, uncomfortable and appears to be acutely withdrawing. Reports he takes 50mg percocet daily for the past year. Buys off street, does not test for fentanyl. Last methadone use was a month ago through a treatment facility. Endorses discomfort, anxiety, cold seats, nausea, no diarrhea or stomach cramping.      OBJECTIVE:  /72 (BP Location: Right arm, Patient Position: Right side, Cuff Size: Adult Regular)   Pulse 78   Temp 97.7  F (36.5  C) (Oral)   Resp 16   SpO2 99%     Exam:   General: Alert and oriented, in no acute distress.  Skin: sweaty  ENT: Speech intact, nasal passages open, no hearing impairment noted.  CV: No cyanosis or pallor, warm and well perfused.  Respiratory: No respiratory distress, no accessory muscle use.  Neuro: Gait and station normal, comprehension intact. Gross and fine motor skills intact.   Psychiatric: Mood and affect appear normal.   Extremities: Warm, able to move all four extremities at will.      ASSESSMENT/PLAN:    # Opioid withdrawal  Exam and history consistent with opioid withdrawal. Discuss options with patient including suboxone versus methadone--patient reports suboxone has made him sick in the past. Discussed that suboxone can kick people into withdrawal, and since he is actively withdrawing, I would recommend it for this instance. Patient prefers methadone.    30mg methadone ordered  Comfort meds ordered including prn clonidine, robaxin, atarax, imodium  UDS added to yesterday's urine  Please discuss with patient tomorrow desire for detox versus maintenance on methadone    Please see daily rounding note for full A/P.  Joaquina Contreras MD  9:15 PM
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
## 2023-12-10 NOTE — PROGRESS NOTES
Brief Social Work Note      Resident asked SW to provide patient with list of outpatient methadone clinics. Upon arrival to the unit, patient was already on his way to outpatient pharmacy. SW found them in the hallway on the way to the pharmacy. Provided resource to patient.        DARRYN Andujar LSW  12/10/2023       Social Work and Care Management Department       SEARCHABLE in [employer] - search SOCIAL WORK       [place] (0800 - 1630) Saturday and Sunday     Units: 4A, 4C, & 4E Pager: [phone]     Units: 5A & 5C Pager: [phone]     Units: 6A & 6B   Pager: [phone]     Units: 6C Pager: [phone]     Units: 7A & 7B  Pager: [phone]     Units: 7C Pager: [phone]     Unit: [facility] ED Pager: [phone]      [facility] ([id number]) Saturday and Sunday      Units: 5 Ortho, 5 Med/Surg & WB ED  Pager:[phone]     Units: 6 Med/Surg, 8A, & 10A ICU  Pager: [phone]        After hours (1630 - 0000) Saturday & Sunday; ([id number]) Mon-Fri; ([id number]) [facility] Recognized Holidays     Units: ALL  Pager: [phone]

## 2023-12-10 NOTE — PLAN OF CARE
Goal Outcome Evaluation:      6MS DISCHARGE    D: Patient discharged to Home at 1500. Patient accompanied by none.    I: Discharge prescriptions given to patient. All discharge medications and instructions reviewed with Patient. Patient instructed to seek care if experiencing worsening symptoms, such fevers, high body temps. Other phone numbers to call with questions or concerns after discharge reviewed. PIV removed. Education completed.    A: Patient verbalized understanding of discharge medications and instructions. Prescribed home medications given to patient.  Belongings returned to patient.

## 2023-12-11 LAB
ATRIAL RATE - MUSE: 55 BPM
BACTERIA SPT CULT: NORMAL
DIASTOLIC BLOOD PRESSURE - MUSE: NORMAL MMHG
GRAM STAIN RESULT: NORMAL
INTERPRETATION ECG - MUSE: NORMAL
P AXIS - MUSE: 159 DEGREES
PR INTERVAL - MUSE: 162 MS
QRS DURATION - MUSE: 112 MS
QT - MUSE: 420 MS
QTC - MUSE: 401 MS
R AXIS - MUSE: 121 DEGREES
SYSTOLIC BLOOD PRESSURE - MUSE: NORMAL MMHG
T AXIS - MUSE: 137 DEGREES
VENTRICULAR RATE- MUSE: 55 BPM

## 2023-12-13 LAB — BACTERIA BLD CULT: NO GROWTH

## 2023-12-14 LAB
BACTERIA BLD CULT: NO GROWTH
BACTERIA BLD CULT: NO GROWTH

## 2023-12-15 LAB
BACTERIA BLD CULT: NO GROWTH
BACTERIA BLD CULT: NO GROWTH

## 2023-12-29 LAB
BACTERIA BLD CULT: ABNORMAL
BACTERIA BLD CULT: ABNORMAL

## (undated) DEVICE — ESU GROUND PAD ADULT W/CORD E7507

## (undated) DEVICE — LINEN TOWEL PACK X6 WHITE 5487

## (undated) DEVICE — SOL NACL 0.9% 10ML VIAL 0409-4888-02

## (undated) DEVICE — SYR BULB IRRIG DOVER 60 ML LATEX FREE 67000

## (undated) DEVICE — DRSG GAUZE FLUFTEX RADIOPAQUE 4.5"X4.1YD 11-020

## (undated) DEVICE — Device

## (undated) DEVICE — LINEN TOWEL PACK X30 5481

## (undated) DEVICE — DECANTER VIAL 2006S

## (undated) DEVICE — CUP AND LID 2PK 2OZ STERILE  SSK9006A

## (undated) DEVICE — TEST TUBE W/SCREW CAP 17361

## (undated) RX ORDER — FENTANYL CITRATE 50 UG/ML
INJECTION, SOLUTION INTRAMUSCULAR; INTRAVENOUS
Status: DISPENSED
Start: 2023-08-28

## (undated) RX ORDER — LIDOCAINE HYDROCHLORIDE 10 MG/ML
INJECTION, SOLUTION EPIDURAL; INFILTRATION; INTRACAUDAL; PERINEURAL
Status: DISPENSED
Start: 2023-08-28

## (undated) RX ORDER — HYDROMORPHONE HYDROCHLORIDE 1 MG/ML
INJECTION, SOLUTION INTRAMUSCULAR; INTRAVENOUS; SUBCUTANEOUS
Status: DISPENSED
Start: 2023-08-28

## (undated) RX ORDER — LIDOCAINE HYDROCHLORIDE 5 MG/ML
INJECTION, SOLUTION INFILTRATION; INTRAVENOUS
Status: DISPENSED
Start: 2023-08-28

## (undated) RX ORDER — BUPIVACAINE HYDROCHLORIDE 5 MG/ML
INJECTION, SOLUTION EPIDURAL; INTRACAUDAL
Status: DISPENSED
Start: 2023-08-28

## (undated) RX ORDER — KETOROLAC TROMETHAMINE 30 MG/ML
INJECTION, SOLUTION INTRAMUSCULAR; INTRAVENOUS
Status: DISPENSED
Start: 2023-08-28